# Patient Record
Sex: MALE | Race: OTHER | NOT HISPANIC OR LATINO
[De-identification: names, ages, dates, MRNs, and addresses within clinical notes are randomized per-mention and may not be internally consistent; named-entity substitution may affect disease eponyms.]

---

## 2018-08-17 NOTE — ASU PATIENT PROFILE, ADULT - PMH
Atrial fibrillation    HLD (hyperlipidemia)    HTN (hypertension)    MARGARITA (obstructive sleep apnea)  pt denies  Pulmonary HTN

## 2018-08-19 ENCOUNTER — TRANSCRIPTION ENCOUNTER (OUTPATIENT)
Age: 83
End: 2018-08-19

## 2018-08-20 ENCOUNTER — OUTPATIENT (OUTPATIENT)
Dept: OUTPATIENT SERVICES | Facility: HOSPITAL | Age: 83
LOS: 1 days | End: 2018-08-20
Payer: MEDICARE

## 2018-08-20 VITALS
SYSTOLIC BLOOD PRESSURE: 181 MMHG | TEMPERATURE: 98 F | DIASTOLIC BLOOD PRESSURE: 63 MMHG | RESPIRATION RATE: 22 BRPM | OXYGEN SATURATION: 95 % | HEART RATE: 60 BPM | HEIGHT: 70.5 IN | WEIGHT: 206.79 LBS

## 2018-08-20 VITALS
HEART RATE: 63 BPM | DIASTOLIC BLOOD PRESSURE: 47 MMHG | RESPIRATION RATE: 15 BRPM | SYSTOLIC BLOOD PRESSURE: 146 MMHG | OXYGEN SATURATION: 97 %

## 2018-08-20 DIAGNOSIS — H33.022 RETINAL DETACHMENT WITH MULTIPLE BREAKS, LEFT EYE: ICD-10-CM

## 2018-08-20 DIAGNOSIS — Z95.0 PRESENCE OF CARDIAC PACEMAKER: Chronic | ICD-10-CM

## 2018-08-20 DIAGNOSIS — Z98.49 CATARACT EXTRACTION STATUS, UNSPECIFIED EYE: Chronic | ICD-10-CM

## 2018-08-20 PROCEDURE — C1814: CPT

## 2018-08-20 PROCEDURE — C1784: CPT

## 2018-08-20 PROCEDURE — 67108 REPAIR DETACHED RETINA: CPT | Mod: LT

## 2018-08-20 PROCEDURE — C1889: CPT

## 2018-08-20 NOTE — ASU DISCHARGE PLAN (ADULT/PEDIATRIC). - SPECIAL INSTRUCTIONS
tylenol  (acetaminophen) for pain  leave on eye patch  Position face down during the day  Sleep right ear on pillow  Do NOT sleep on back

## 2020-10-15 NOTE — ASU PATIENT PROFILE, ADULT - NS PRO AD INFO GIVEN Y
DISPLAY PLAN FREE TEXT DISPLAY PLAN FREE TEXT DISPLAY PLAN FREE TEXT DISPLAY PLAN FREE TEXT DISPLAY PLAN FREE TEXT DISPLAY PLAN FREE TEXT DISPLAY PLAN FREE TEXT DISPLAY PLAN FREE TEXT DISPLAY PLAN FREE TEXT DISPLAY PLAN FREE TEXT DISPLAY PLAN FREE TEXT DISPLAY PLAN FREE TEXT DISPLAY PLAN FREE TEXT DISPLAY PLAN FREE TEXT DISPLAY PLAN FREE TEXT DISPLAY PLAN FREE TEXT DISPLAY PLAN FREE TEXT yes

## 2022-02-13 NOTE — ASU PATIENT PROFILE, ADULT - ABLE TO REACH PT
yes 61yo M w/ right foot gangrene   - pt seen and evaluated  - afebrile, WBC 21.95 from yesterday- awaiting AM labs   - Right foot dry gangrene digits 4 and 5 now demarcating to plantar met necks w/ ischemic changes and dusky appearance to the right foot to level of rearfoot, no bogginess, no malodor, no fluctuance, no purulence expressed, right foot cool to touch  - plantar right foot submet 4/5 I&D site further explored today w/ fascial planes intact, no purulence expressed, no malodor, no tracking  - pressure wounds to right foot lateral 5th met styloid, posterior heel, and medial malleolus to level of subq, no probing to bone, no acute signs of infection   - right foot plantar hallux eschar w/ wound to bone, no fluctuance, no bogginess, no malodor, no purulence   - left foot no open wounds or lesions  - Right foot and ankle xrays showing no gas, no OM right foot, mild degeneration of malleolar tips   - right foot wound culture results pending   - recommend continue IV Vanco, Zosyn  - TISH/PVR: calcified noncompressible vessels b/l, RLE waveforms reduced in calf and ankle, LLE waveforms monophasic   - RFMR results pending, preliminary images reviewed by resident w/ no suspected abscess   - Recommend ID consult  - recommend vascular consult for potential proximal amputation   - salvageability of right foot is highly guarded, discussed possible proximal amputation w/ patient pending vasc recs and he is amenable   - pod plan [facility] pending vasc recs   - discussed w/ attending  63yo M w/ right foot gangrene   - pt seen and evaluated  - afebrile, WBC 21.95 from yesterday- awaiting AM labs   - Right foot dry gangrene digits 4 and 5 now demarcating to plantar met necks w/ ischemic changes and dusky appearance to the right foot to level of rearfoot, no bogginess, no malodor, no fluctuance, no purulence expressed, right foot cool to touch  - plantar right foot submet 4/5 I&D site further explored today w/ fascial planes intact, no purulence expressed, no malodor, no tracking  - pressure wounds to right foot lateral 5th met styloid, posterior heel, and medial malleolus to level of subq, no probing to bone, no acute signs of infection   - right foot plantar hallux eschar w/ wound to bone, no fluctuance, no bogginess, no malodor, no purulence   - left foot no open wounds or lesions  - Right foot and ankle xrays showing no gas, no OM right foot, mild degeneration of malleolar tips   - right foot wound culture results pending   - recommend continue IV Vanco, Zosyn  - TISH/PVR: calcified noncompressible vessels b/l, RLE waveforms reduced in calf and ankle, LLE waveforms monophasic   - RFMR results pending  - Recommend ID consult  - recommend vascular consult for potential proximal amputation   - salvageability of right foot is highly guarded, discussed possible proximal amputation w/ patient pending vasc recs and he is amenable   - pod plan [facility] pending vasc recs   - discussed w/ attending

## 2023-08-08 ENCOUNTER — INPATIENT (INPATIENT)
Facility: HOSPITAL | Age: 88
LOS: 2 days | Discharge: HOME | DRG: 291 | End: 2023-08-11
Attending: INTERNAL MEDICINE | Admitting: INTERNAL MEDICINE
Payer: MEDICARE

## 2023-08-08 VITALS
HEART RATE: 70 BPM | SYSTOLIC BLOOD PRESSURE: 149 MMHG | DIASTOLIC BLOOD PRESSURE: 54 MMHG | OXYGEN SATURATION: 91 % | RESPIRATION RATE: 20 BRPM | TEMPERATURE: 99 F | HEIGHT: 70 IN | WEIGHT: 198.42 LBS

## 2023-08-08 DIAGNOSIS — Z98.49 CATARACT EXTRACTION STATUS, UNSPECIFIED EYE: Chronic | ICD-10-CM

## 2023-08-08 DIAGNOSIS — J96.01 ACUTE RESPIRATORY FAILURE WITH HYPOXIA: ICD-10-CM

## 2023-08-08 DIAGNOSIS — Z95.0 PRESENCE OF CARDIAC PACEMAKER: Chronic | ICD-10-CM

## 2023-08-08 DIAGNOSIS — N40.0 BENIGN PROSTATIC HYPERPLASIA WITHOUT LOWER URINARY TRACT SYMPTOMS: ICD-10-CM

## 2023-08-08 DIAGNOSIS — I50.9 HEART FAILURE, UNSPECIFIED: ICD-10-CM

## 2023-08-08 DIAGNOSIS — I48.0 PAROXYSMAL ATRIAL FIBRILLATION: ICD-10-CM

## 2023-08-08 DIAGNOSIS — N17.9 ACUTE KIDNEY FAILURE, UNSPECIFIED: ICD-10-CM

## 2023-08-08 DIAGNOSIS — Z29.9 ENCOUNTER FOR PROPHYLACTIC MEASURES, UNSPECIFIED: ICD-10-CM

## 2023-08-08 PROBLEM — E78.5 HYPERLIPIDEMIA, UNSPECIFIED: Chronic | Status: ACTIVE | Noted: 2018-08-20

## 2023-08-08 PROBLEM — I27.20 PULMONARY HYPERTENSION, UNSPECIFIED: Chronic | Status: ACTIVE | Noted: 2018-08-20

## 2023-08-08 PROBLEM — I10 ESSENTIAL (PRIMARY) HYPERTENSION: Chronic | Status: ACTIVE | Noted: 2018-08-20

## 2023-08-08 PROBLEM — G47.33 OBSTRUCTIVE SLEEP APNEA (ADULT) (PEDIATRIC): Chronic | Status: ACTIVE | Noted: 2018-08-20

## 2023-08-08 PROBLEM — I48.91 UNSPECIFIED ATRIAL FIBRILLATION: Chronic | Status: ACTIVE | Noted: 2018-08-20

## 2023-08-08 LAB
ALBUMIN SERPL ELPH-MCNC: 3.5 G/DL — SIGNIFICANT CHANGE UP (ref 3.5–5)
ALP SERPL-CCNC: 91 U/L — SIGNIFICANT CHANGE UP (ref 40–120)
ALT FLD-CCNC: 23 U/L DA — SIGNIFICANT CHANGE UP (ref 10–60)
ANION GAP SERPL CALC-SCNC: 6 MMOL/L — SIGNIFICANT CHANGE UP (ref 5–17)
APTT BLD: 35.1 SEC — SIGNIFICANT CHANGE UP (ref 24.5–35.6)
AST SERPL-CCNC: 22 U/L — SIGNIFICANT CHANGE UP (ref 10–40)
BASOPHILS # BLD AUTO: 0.06 K/UL — SIGNIFICANT CHANGE UP (ref 0–0.2)
BASOPHILS NFR BLD AUTO: 1 % — SIGNIFICANT CHANGE UP (ref 0–2)
BILIRUB SERPL-MCNC: 0.7 MG/DL — SIGNIFICANT CHANGE UP (ref 0.2–1.2)
BUN SERPL-MCNC: 31 MG/DL — HIGH (ref 7–18)
CALCIUM SERPL-MCNC: 8.6 MG/DL — SIGNIFICANT CHANGE UP (ref 8.4–10.5)
CHLORIDE SERPL-SCNC: 99 MMOL/L — SIGNIFICANT CHANGE UP (ref 96–108)
CO2 SERPL-SCNC: 24 MMOL/L — SIGNIFICANT CHANGE UP (ref 22–31)
CREAT SERPL-MCNC: 1.48 MG/DL — HIGH (ref 0.5–1.3)
EGFR: 45 ML/MIN/1.73M2 — LOW
EOSINOPHIL # BLD AUTO: 0.11 K/UL — SIGNIFICANT CHANGE UP (ref 0–0.5)
EOSINOPHIL NFR BLD AUTO: 1.9 % — SIGNIFICANT CHANGE UP (ref 0–6)
GLUCOSE SERPL-MCNC: 126 MG/DL — HIGH (ref 70–99)
HCT VFR BLD CALC: 25.6 % — LOW (ref 39–50)
HGB BLD-MCNC: 8.1 G/DL — LOW (ref 13–17)
IMM GRANULOCYTES NFR BLD AUTO: 0.3 % — SIGNIFICANT CHANGE UP (ref 0–0.9)
INR BLD: 1.64 RATIO — HIGH (ref 0.85–1.18)
LIDOCAIN IGE QN: 124 U/L — SIGNIFICANT CHANGE UP (ref 73–393)
LYMPHOCYTES # BLD AUTO: 0.89 K/UL — LOW (ref 1–3.3)
LYMPHOCYTES # BLD AUTO: 15.5 % — SIGNIFICANT CHANGE UP (ref 13–44)
MAGNESIUM SERPL-MCNC: 2.1 MG/DL — SIGNIFICANT CHANGE UP (ref 1.6–2.6)
MCHC RBC-ENTMCNC: 24.2 PG — LOW (ref 27–34)
MCHC RBC-ENTMCNC: 31.6 GM/DL — LOW (ref 32–36)
MCV RBC AUTO: 76.4 FL — LOW (ref 80–100)
MONOCYTES # BLD AUTO: 0.88 K/UL — SIGNIFICANT CHANGE UP (ref 0–0.9)
MONOCYTES NFR BLD AUTO: 15.4 % — HIGH (ref 2–14)
NEUTROPHILS # BLD AUTO: 3.77 K/UL — SIGNIFICANT CHANGE UP (ref 1.8–7.4)
NEUTROPHILS NFR BLD AUTO: 65.9 % — SIGNIFICANT CHANGE UP (ref 43–77)
NRBC # BLD: 0 /100 WBCS — SIGNIFICANT CHANGE UP (ref 0–0)
NT-PROBNP SERPL-SCNC: 5355 PG/ML — HIGH (ref 0–450)
PLATELET # BLD AUTO: 236 K/UL — SIGNIFICANT CHANGE UP (ref 150–400)
POTASSIUM SERPL-MCNC: 5.7 MMOL/L — HIGH (ref 3.5–5.3)
POTASSIUM SERPL-SCNC: 5.7 MMOL/L — HIGH (ref 3.5–5.3)
PROT SERPL-MCNC: 6.9 G/DL — SIGNIFICANT CHANGE UP (ref 6–8.3)
PROTHROM AB SERPL-ACNC: 18.4 SEC — HIGH (ref 9.5–13)
RBC # BLD: 3.35 M/UL — LOW (ref 4.2–5.8)
RBC # FLD: 14.9 % — HIGH (ref 10.3–14.5)
SODIUM SERPL-SCNC: 129 MMOL/L — LOW (ref 135–145)
TROPONIN I, HIGH SENSITIVITY RESULT: 27.4 NG/L — SIGNIFICANT CHANGE UP
WBC # BLD: 5.73 K/UL — SIGNIFICANT CHANGE UP (ref 3.8–10.5)
WBC # FLD AUTO: 5.73 K/UL — SIGNIFICANT CHANGE UP (ref 3.8–10.5)

## 2023-08-08 PROCEDURE — 99285 EMERGENCY DEPT VISIT HI MDM: CPT

## 2023-08-08 PROCEDURE — 71045 X-RAY EXAM CHEST 1 VIEW: CPT | Mod: 26

## 2023-08-08 RX ORDER — PANTOPRAZOLE SODIUM 20 MG/1
40 TABLET, DELAYED RELEASE ORAL
Refills: 0 | Status: DISCONTINUED | OUTPATIENT
Start: 2023-08-08 | End: 2023-08-11

## 2023-08-08 RX ORDER — LANOLIN ALCOHOL/MO/W.PET/CERES
3 CREAM (GRAM) TOPICAL AT BEDTIME
Refills: 0 | Status: DISCONTINUED | OUTPATIENT
Start: 2023-08-08 | End: 2023-08-11

## 2023-08-08 RX ORDER — TAMSULOSIN HYDROCHLORIDE 0.4 MG/1
0.4 CAPSULE ORAL AT BEDTIME
Refills: 0 | Status: DISCONTINUED | OUTPATIENT
Start: 2023-08-08 | End: 2023-08-11

## 2023-08-08 RX ORDER — FUROSEMIDE 40 MG
40 TABLET ORAL ONCE
Refills: 0 | Status: COMPLETED | OUTPATIENT
Start: 2023-08-08 | End: 2023-08-08

## 2023-08-08 RX ORDER — ENOXAPARIN SODIUM 100 MG/ML
40 INJECTION SUBCUTANEOUS ONCE
Refills: 0 | Status: COMPLETED | OUTPATIENT
Start: 2023-08-08 | End: 2023-08-08

## 2023-08-08 RX ORDER — ACETAMINOPHEN 500 MG
650 TABLET ORAL EVERY 6 HOURS
Refills: 0 | Status: DISCONTINUED | OUTPATIENT
Start: 2023-08-08 | End: 2023-08-11

## 2023-08-08 RX ORDER — FUROSEMIDE 40 MG
40 TABLET ORAL
Refills: 0 | Status: DISCONTINUED | OUTPATIENT
Start: 2023-08-08 | End: 2023-08-11

## 2023-08-08 RX ORDER — NIFEDIPINE 30 MG
30 TABLET, EXTENDED RELEASE 24 HR ORAL DAILY
Refills: 0 | Status: DISCONTINUED | OUTPATIENT
Start: 2023-08-08 | End: 2023-08-08

## 2023-08-08 RX ORDER — WARFARIN SODIUM 2.5 MG/1
4 TABLET ORAL ONCE
Refills: 0 | Status: COMPLETED | OUTPATIENT
Start: 2023-08-08 | End: 2023-08-08

## 2023-08-08 RX ORDER — ATORVASTATIN CALCIUM 80 MG/1
20 TABLET, FILM COATED ORAL AT BEDTIME
Refills: 0 | Status: DISCONTINUED | OUTPATIENT
Start: 2023-08-08 | End: 2023-08-09

## 2023-08-08 RX ADMIN — TAMSULOSIN HYDROCHLORIDE 0.4 MILLIGRAM(S): 0.4 CAPSULE ORAL at 23:42

## 2023-08-08 RX ADMIN — WARFARIN SODIUM 4 MILLIGRAM(S): 2.5 TABLET ORAL at 23:42

## 2023-08-08 RX ADMIN — ENOXAPARIN SODIUM 40 MILLIGRAM(S): 100 INJECTION SUBCUTANEOUS at 21:23

## 2023-08-08 RX ADMIN — Medication 40 MILLIGRAM(S): at 18:19

## 2023-08-08 NOTE — H&P ADULT - PROBLEM SELECTOR PLAN 1
Pw shortness of breath on exertion and new onset LE +2 edema   intially sating 91% on RA, RR 20  BUN Cr 31/ 1.48   CXR- +CHF and bilateral pleural effusions.  PE: decreased lung sounds with mild exp wheezes b/l, LE +2 edema b/l   s/p lasix 40mg IV   Likely CHF exacertation Pw shortness of breath on exertion and new onset LE +2 edema   intially sating 91% on RA, RR 20  BUN Cr 31/ 1.48   CXR- +CHF and bilateral pleural effusions.  PE: decreased lung sounds with mild exp wheezes b/l, LE +2 edema b/l   s/p lasix 40mg IV   Likely CHF exacertation  started on Lasix 40IV BID   [place] for I/O

## 2023-08-08 NOTE — ED PROVIDER NOTE - OBJECTIVE STATEMENT
done 89M, pmh of AFIB (pacemaker), htn, pulm htn,  Presenting with shortness of breath.  History provided by patient and wife at bedside.  For the past 2 days the patient has felt short of breath and his legs have become significantly swollen.  In the past month the patient had his pacemaker wire replaced.  Denies any headache, dizziness, chest pain, abdominal pain.

## 2023-08-08 NOTE — H&P ADULT - ASSESSMENT
89 y/o M with PMHx HTN, HLD, Afib, PPM, BPH presents to the ED today with chief complaint of increasing exertional shortness of breath and lower extremity swelling. Admitted to medicine for new onset CHF.

## 2023-08-08 NOTE — H&P ADULT - NSHPPOAPULMEMBOLUS_GEN_A_CORE
Problem: Patient Care Overview  Goal: Plan of Care Review  Outcome: Ongoing (interventions implemented as appropriate)   10/02/19 0514   OTHER   Outcome Summary VSS, up ambulating to bathroom, voiding well, po pain meds effective, with valium. Plans to d/c home when able.     Goal: Individualization and Mutuality  Outcome: Ongoing (interventions implemented as appropriate)    Goal: Discharge Needs Assessment  Outcome: Ongoing (interventions implemented as appropriate)    Goal: Interprofessional Rounds/Family Conf  Outcome: Ongoing (interventions implemented as appropriate)      Problem: Fall Risk (Adult)  Goal: Absence of Fall  Outcome: Ongoing (interventions implemented as appropriate)      Problem: Knee Arthroplasty (Total, Partial) (Adult)  Goal: Signs and Symptoms of Listed Potential Problems Will be Absent, Minimized or Managed (Knee Arthroplasty)  Outcome: Ongoing (interventions implemented as appropriate)         no

## 2023-08-08 NOTE — ED PROVIDER NOTE - CARE PLAN
1 Principal Discharge DX:	Acute CHF  Secondary Diagnosis:	Hypoxia  Secondary Diagnosis:	Leg swelling

## 2023-08-08 NOTE — ED ADULT NURSE NOTE - NSICDXPASTMEDICALHX_GEN_ALL_CORE_FT
PAST MEDICAL HISTORY:  Atrial fibrillation     HLD (hyperlipidemia)     HTN (hypertension)     MARGARITA (obstructive sleep apnea) pt denies    Pulmonary HTN

## 2023-08-08 NOTE — H&P ADULT - ATTENDING COMMENTS
89 y/o M with PMHx HTN, HLD, Afib, PPM, BPH,Anemia CVA mild R sided res weakness, presents to the ED today with chief complaint of increasing exertional shortness of breath and lower extremity swelling,acute diastolic HF with acute hypoxic respiratory failure,,QUIANA,hyponatremia and hyperkalemia.  1.Acute diastolic HF-IV lasix.  2.QUIANA and hyponatremia-Renal eval.  3.Afib and CVA-lovenox and coumadin.  4.Anemia profile,  5.Echocardiogram.  6.HTN-hold procardia xl.  7.BPH-flomax.  8.PPI.

## 2023-08-08 NOTE — H&P ADULT - HISTORY OF PRESENT ILLNESS
Full note to follow  89 y/o M with PMHx HTN, HLD, Afib, PPM, presents to the ED today with chief complaint of increasing exertional shortness of breath and lower extremity swelling. Pt and wife at the bedside state that pt was recently seen at [facility] 89 y/o M with PMHx HTN, HLD, Afib, PPM, BPH presents to the ED today with chief complaint of increasing exertional shortness of breath and lower extremity swelling. Pt and wife at the bedside state that pts symtoms started around Sunday night where he was more short of breath and noticed his both legs started to get heavy and swollen. Pt was recently seen at [facility] for a PPM wire replacement. Follows Dr Womack outpatient. Is currently on Coumadin. Otherwise denies any chest pain, headache, weakness, visual changes, fever, chills. n/v/d.     In the ED   /54, BP 70, T 98.8, RR 20, 91% on RA started on 4L  Hbg 8.1, INR 89 y/o M with PMHx HTN, HLD, Afib, PPM, BPH presents to the ED today with chief complaint of increasing exertional shortness of breath and lower extremity swelling. Pt and wife at the bedside state that pts symtoms started around Sunday night where he was more short of breath and noticed his both legs started to get heavy and swollen. Pt was recently seen at [facility] for a PPM wire replacement. Follows Dr Womack outpatient. Is currently on Coumadin. Otherwise denies any chest pain, headache, weakness, visual changes, fever, chills. n/v/d.     In the ED   /54, BP 70, T 98.8, RR 20, 91% on RA started on 4L  Hbg 8.1, INR 1.64, Na  89 y/o M with PMHx HTN, HLD, Afib, PPM, BPH presents to the ED today with chief complaint of increasing exertional shortness of breath and lower extremity swelling. Pt and wife at the bedside state that pts symptoms started around Sunday night where he was more short of breath and noticed his both legs started to get heavy and swollen. Pt also noticed a wet cough for the past few days with white sputum. Pt was recently seen at [facility] for a PPM wire replacement. Follows Dr Womack outpatient. Is currently on Coumadin. Otherwise denies any chest pain, headache, weakness, visual changes, fever, chills. n/v/d.     In the ED   /54, BP 70, T 98.8, RR 20, 91% on RA started on 4L  Hbg 8.1, INR 1.64,  BUN Cr 31/ 1.48- ProBNP 5355  EKG- paced   CXR- +CHF and bilateral pleural effusions.  PE: decreased lung sounds with mild exp wheezes b/l, LE +2 edema b/l   s/p lasix 40mg IV    89 y/o M with PMHx HTN, HLD, Afib, PPM, BPH, CVA mild R sided res weakness, presents to the ED today with chief complaint of increasing exertional shortness of breath and lower extremity swelling. Pt and wife at the bedside state that pts symptoms started around Sunday night where he was more short of breath and noticed his both legs started to get heavy and swollen. Pt also noticed a wet cough for the past few days with white sputum. Pt was recently seen at [facility] for a PPM wire replacement. Follows Dr Womack outpatient. Is currently on Coumadin. Otherwise denies any chest pain, headache, weakness, visual changes, fever, chills. n/v/d.     In the ED   /54, BP 70, T 98.8, RR 20, 91% on RA started on 4L  Hbg 8.1, INR 1.64,  BUN Cr 31/ 1.48- ProBNP 5355  EKG- paced   CXR- +CHF and bilateral pleural effusions.  PE: decreased lung sounds with mild exp wheezes b/l, LE +2 edema b/l   s/p lasix 40mg IV    89 y/o M with PMHx HTN, HLD, Afib, PPM, BPH, Anemia, CVA mild R sided res weakness, presents to the ED today with chief complaint of increasing exertional shortness of breath and lower extremity swelling. Pt and wife at the bedside state that pts symptoms started around Sunday night where he was more short of breath and noticed his both legs started to get heavy and swollen. Pt also noticed a wet cough for the past few days with white sputum. Pt was recently seen at [facility] for a PPM wire replacement. Follows Dr Womack outpatient. Is currently on Coumadin. Otherwise denies any chest pain, headache, weakness, visual changes, fever, chills. n/v/d.     In the ED   /54, BP 70, T 98.8, RR 20, 91% on RA started on 4L  Hbg 8.1, INR 1.64,  BUN Cr 31/ 1.48- ProBNP 5355  EKG- paced   CXR- +CHF and bilateral pleural effusions.  PE: decreased lung sounds with mild exp wheezes b/l, LE +2 edema b/l   s/p lasix 40mg IV

## 2023-08-08 NOTE — ED ADULT NURSE NOTE - EDEMA DEGREE BILATERAL
Date of Service: 7/29/2021     OUTPATIENT PHYSICAL MEDICINE REHABILITATION REEVALUATION     HISTORY OF PRESENT ILLNESS:  This 62 year old left-handed White   male is seen by me for re-evaluation of spastic left hemiparesis.     Patient received 200 units of both lung toxin injection to his left lower extremity on 07/01/2021 and procedure was done by me.  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  His initial hospital care was done at [facility] and he did receive intensive inpatient rehabilitation from 07/24/2020 to 09/03/2020 and he received in-home PT and OT.  I evaluated him on 05/10/2021 and recommended outpatient neuro rehabilitation program and he has been receiving physical and occupational therapy in the neuro rehab program.     Patient indicates that he responded well to the Botox injection and he is able to advance his left lower extremity now and able to ambulate with the use of a quad cane with some assistance from his daughter or granddaughter at home.  He is using custom AFO for his left lower extremity.  He is unable to walk without the use of AFO as his knees tends to hyperextend and make him wobbly.  He still feels somewhat tight on his hip flexors on the left.  He also complain of occasional muscle spasm on the left lateral thigh while laying supine.    ALLERGIES, MEDICATIONS, AND HISTORIES:    I have reviewed his allergies and medications, also reviewed his past medical history, social history, all unchanged.       REVIEW OF SYSTEMS:    I reviewed 12 systems.  They are all negative except what is mentioned before.  He denies any problem with hisbowel and bladder.  He is also sleeping well.  Overall his left leg spasm has improved significantly.    Review of Therapy Notes:  Patient received only 5 visits of physical therapy due to  his insurance limitation and certainly that is not enough for him.  His 6 minute walk test improved from 40 ft with quad cane and left AFO to 100 feet.  Recovery from spasm has improved substantially after Botox injection and does decrease quicker with approximation through the hip joint.  He has progressed significantly despite his visit restrictions, improving his 6 minute walk test, establishing how to safely perform a floor transfer with assistance, and his ability to negotiate stairs using a single railing.  He continues to have significant restrictions in mobility and would continue to benefit from physical therapy to improve his gait mechanics, activity tolerance, stair negotiation, static and dynamic balance, coordination and exercises to assist with maximizing the affect of his Botox injections.    He receives 6 visits of occupational therapy and due to limitation of the visit from the insurance company he is being discharged from the OT.  He made steady progress in ADL areas with upper extremity dressing improving from moderate to minimal assist, toileting from dependent to minimal assist and functional transfer from minimal to supervision with small base quad cane.  Patient is reporting less pain and tolerating left upper extremity passive range of motion exercises better with improvement of range of motion at the shoulder to 90 degree.     PHYSICAL EXAMINATION:  GENERAL:  Alert, oriented male.  Accompanied by her granddaughter today.  He is well developed, well nourished, well groomed and very cooperative.  VITAL SIGNS:    Visit Vitals  /84 (BP Location: RUE - Right upper extremity, Patient Position: Sitting)   Pulse 70   Temp 98 °F (36.7 °C) (Temporal)   Wt 86.2 kg   BMI 28.89 kg/m²     HEENT:  Unremarkable.  NECK:  Supple.  No lymphadenopathy, no thyromegaly, no carotid bruit.  CHEST:  Clear to auscultation bilaterally.  HEART:  Regular rhythm, S1, S2 normal, no murmur audible.  ABDOMEN:  Soft on  palpation, no tenderness, no organomegaly.  Bowel sounds are active.  SKIN:  Warm and dry, no open area noted.  EXTREMITIES:  Does not reveal any edema, no calf tenderness.  SKIN:  Color, texture, turgor normal, no rashes or lesions in exposed areas.    NEUROLOGIC:  Heis alert, oriented.  Memory is intact, speech normal.  Cranial nerves are within normal limits.  Sensation intact.  Has some increased tone in the hip flexors today.  Hamstring and adductor tone has improved significantly since the Botox injection.  No active movement noted in the left upper extremity.  Mild increased flexor tone noted in the fingers of the left hand.  Has minimal active movement in his left leg and hip flexor and knee flexor strength appear to be 2/5.  Left ankle dorsiflexor strength is 0/5.  Left knee extensor 2 to 3/5.  Deep tendon reflexes are 2+ and symmetrical.  Gait evaluation with the left AFO and quad cane revealed typical hemiplegic gait with increased flexion in left hip and knee.  Without the AFO his left knee hyperextends and he is unable to advance his left leg.    MUSCULOSKELETAL:  Normal range of motion and strength in the right upper and lower extremity.  IMPRESSION:   1. 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  2. Essential hypertension  3. Insulin-dependent diabetes mellitus  4. 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   PLAN:  The patient needs continued physical and occupational therapy twice weekly basis as he is making steady progress with therapies.  Discontinuation of therapy at this time will be detrimental for him.  Advised family to appeal to the insurance company for more therapy visit allowance.  He will be ready for another course of botulinum toxin injection after 10/01/2021 and I will consider increasing the dose to 300 units of Botox from 200 units as he will need more dose in his left  hip flexor.  Granddaughter is inquiring about possibility of his admission to the inpatient rehabilitation unit in the hospital.  I advised her that he already completed the course of inpatient stroke rehabilitation about a year ago and he really does not have any medical necessity to be admitted to the hospital rehabilitation unit and his therapy needs can be managed in outpatient basis.  Patient remain highly motivated and he does have a very supportive daughter and granddaughter.     TIME SPENT:    40 minutes.     Ashley Kenney MD  7/29/2021   3+ pitting (1/2-1 in) (disappears in 1-2 minutes)

## 2023-08-08 NOTE — H&P ADULT - PROBLEM SELECTOR PLAN 2
pt takes Warfarin 3mg and 4mg alternating daily pt takes Warfarin 3mg and 4mg alternating daily  given 1 dose of 4mg now   f/u INR daily

## 2023-08-08 NOTE — ED PROVIDER NOTE - CLINICAL SUMMARY MEDICAL DECISION MAKING FREE TEXT BOX
89-year-old male presenting with leg swelling and shortness of breath.  Patient satting 86% on room air, placed on 4 L NC.  Spoke with patient's physician Dr. Fair: [phone] who reported concern that the patient's pacemaker was not working and that he might be in heart failure.  EKG appears to show pacemaker capture the patient does appear to be fluid overloaded so concern for new onset CHF.  Will get labs and chest x-ray to assess but patient will likely need to be admitted.  Patient's primary physician requesting the patient be admitted to Dr. Acosta.

## 2023-08-08 NOTE — H&P ADULT - PROBLEM SELECTOR PLAN 3
BUN Cr 31/ 1.48   baseline unknown   likely in the setting of acute CHF exacerbation  avoid nephrotoxic agents BUN Cr 31/ 1.48   baseline unknown   likely in the setting of acute CHF exacerbation  avoid nephrotoxic agents  consulted Nephro Dr. Kaminski

## 2023-08-08 NOTE — H&P ADULT - NSHPPHYSICALEXAM_GEN_ALL_CORE
VITALS:     GENERAL: NAD, elderly lying in bed comfortably  HEAD:  Atraumatic, Normocephalic  EYES: EOMI, PERRLA, conjunctiva and sclera clear  ENT: Moist mucous membranes  NECK: Supple, No JVD  CHEST/LUNG: dec breath sounds b/l with mild exp wheezes in upper lobes b/l   HEART: Regular rate and rhythm; No murmurs, rubs, or gallops  ABDOMEN: Bowel sounds present; Soft, Nontender, Nondistended. No hepatomegaly  EXTREMITIES:  2+ Peripheral Pulses, brisk capillary refill. No clubbing, cyanosis, or edema  NERVOUS SYSTEM:  Alert & Oriented X3, speech clear. No deficits   MSK: FROM all 4 extremities, full and equal strength  SKIN: L chest scar from PPM No rashes or lesions

## 2023-08-08 NOTE — ED PROVIDER NOTE - PHYSICAL EXAMINATION
General: well appearing male, no acute distress   HEENT: normocephalic, atraumatic   Respiratory: increased work of breathing, lungs clear to auscultation bilaterally   Cardiac: regular rate and rhythm   Abdomen: soft, non-tender, no guarding or rebound   MSK: bilateral 2+ pitting edema   Skin: warm, dry   Neuro: A&Ox3  Psych: appropriate affect Shilpa Ashby

## 2023-08-08 NOTE — ED ADULT NURSE REASSESSMENT NOTE - NS ED NURSE REASSESS COMMENT FT1
assuming care for this pt from RICHARD jackson. pt alert and oriented x 4. on cardiac monitor, reading a paced rhythm. pt on o2 at 3l/nc sats 91%. denies any distress. side rails up for safety. family at bedside.

## 2023-08-08 NOTE — ED ADULT NURSE NOTE - OBJECTIVE STATEMENT
Pt AOx4, ambulatory, sent from PCP office for SOB, abnormal chest X-rays, BL LE swelling, congested chest and cough x 1 week, worsening in past 2 days. h/o a-fib, PPM in place. EKG done, pt placed on cardiac monitor, no signs of distress noted. Pt AOx4, ambulatory, h/o CVA, right sided residual, a-fib, PPM in place.. sent from PCP office for SOB, abnormal chest X-rays, BL LE swelling, congested chest and cough x 1 week, worsening in past 2 days.  EKG done, pt placed on cardiac monitor, no signs of distress noted.

## 2023-08-08 NOTE — ED ADULT NURSE NOTE - NSFALLHARMRISKINTERV_ED_ALL_ED

## 2023-08-09 DIAGNOSIS — Z02.9 ENCOUNTER FOR ADMINISTRATIVE EXAMINATIONS, UNSPECIFIED: ICD-10-CM

## 2023-08-09 DIAGNOSIS — D64.9 ANEMIA, UNSPECIFIED: ICD-10-CM

## 2023-08-09 LAB
ANION GAP SERPL CALC-SCNC: 11 MMOL/L — SIGNIFICANT CHANGE UP (ref 5–17)
APPEARANCE UR: CLEAR — SIGNIFICANT CHANGE UP
APTT BLD: 36.1 SEC — HIGH (ref 24.5–35.6)
BACTERIA # UR AUTO: ABNORMAL /HPF
BILIRUB UR-MCNC: NEGATIVE — SIGNIFICANT CHANGE UP
BUN SERPL-MCNC: 30 MG/DL — HIGH (ref 7–18)
CALCIUM SERPL-MCNC: 8.9 MG/DL — SIGNIFICANT CHANGE UP (ref 8.4–10.5)
CHLORIDE SERPL-SCNC: 100 MMOL/L — SIGNIFICANT CHANGE UP (ref 96–108)
CHOLEST SERPL-MCNC: 93 MG/DL — SIGNIFICANT CHANGE UP
CO2 SERPL-SCNC: 22 MMOL/L — SIGNIFICANT CHANGE UP (ref 22–31)
COLOR SPEC: YELLOW — SIGNIFICANT CHANGE UP
CREAT ?TM UR-MCNC: 18 MG/DL — SIGNIFICANT CHANGE UP
CREAT SERPL-MCNC: 1.28 MG/DL — SIGNIFICANT CHANGE UP (ref 0.5–1.3)
DIFF PNL FLD: NEGATIVE — SIGNIFICANT CHANGE UP
EGFR: 53 ML/MIN/1.73M2 — LOW
EPI CELLS # UR: ABNORMAL /HPF
GLUCOSE SERPL-MCNC: 102 MG/DL — HIGH (ref 70–99)
GLUCOSE UR QL: NEGATIVE — SIGNIFICANT CHANGE UP
HCT VFR BLD CALC: 24.5 % — LOW (ref 39–50)
HDLC SERPL-MCNC: 42 MG/DL — SIGNIFICANT CHANGE UP
HGB BLD-MCNC: 7.7 G/DL — LOW (ref 13–17)
INR BLD: 1.55 RATIO — HIGH (ref 0.85–1.18)
KETONES UR-MCNC: NEGATIVE — SIGNIFICANT CHANGE UP
LEUKOCYTE ESTERASE UR-ACNC: NEGATIVE — SIGNIFICANT CHANGE UP
LIPID PNL WITH DIRECT LDL SERPL: 43 MG/DL — SIGNIFICANT CHANGE UP
MAGNESIUM SERPL-MCNC: 2.2 MG/DL — SIGNIFICANT CHANGE UP (ref 1.6–2.6)
MCHC RBC-ENTMCNC: 24 PG — LOW (ref 27–34)
MCHC RBC-ENTMCNC: 31.4 GM/DL — LOW (ref 32–36)
MCV RBC AUTO: 76.3 FL — LOW (ref 80–100)
NITRITE UR-MCNC: NEGATIVE — SIGNIFICANT CHANGE UP
NON HDL CHOLESTEROL: 51 MG/DL — SIGNIFICANT CHANGE UP
NRBC # BLD: 0 /100 WBCS — SIGNIFICANT CHANGE UP (ref 0–0)
OSMOLALITY UR: 246 MOS/KG — SIGNIFICANT CHANGE UP (ref 50–1200)
PH UR: 7 — SIGNIFICANT CHANGE UP (ref 5–8)
PHOSPHATE SERPL-MCNC: 3.9 MG/DL — SIGNIFICANT CHANGE UP (ref 2.5–4.5)
PLATELET # BLD AUTO: 211 K/UL — SIGNIFICANT CHANGE UP (ref 150–400)
POTASSIUM SERPL-MCNC: 4.5 MMOL/L — SIGNIFICANT CHANGE UP (ref 3.5–5.3)
POTASSIUM SERPL-SCNC: 4.5 MMOL/L — SIGNIFICANT CHANGE UP (ref 3.5–5.3)
PROT UR-MCNC: 15 MG/DL
PROTHROM AB SERPL-ACNC: 17.4 SEC — HIGH (ref 9.5–13)
RBC # BLD: 3.21 M/UL — LOW (ref 4.2–5.8)
RBC # FLD: 14.7 % — HIGH (ref 10.3–14.5)
RBC CASTS # UR COMP ASSIST: SIGNIFICANT CHANGE UP /HPF (ref 0–2)
SODIUM SERPL-SCNC: 133 MMOL/L — LOW (ref 135–145)
SODIUM UR-SCNC: 64 MMOL/L — SIGNIFICANT CHANGE UP
SP GR SPEC: 1 — LOW (ref 1.01–1.02)
TRIGL SERPL-MCNC: 42 MG/DL — SIGNIFICANT CHANGE UP
UROBILINOGEN FLD QL: NEGATIVE — SIGNIFICANT CHANGE UP
WBC # BLD: 5.72 K/UL — SIGNIFICANT CHANGE UP (ref 3.8–10.5)
WBC # FLD AUTO: 5.72 K/UL — SIGNIFICANT CHANGE UP (ref 3.8–10.5)
WBC UR QL: SIGNIFICANT CHANGE UP /HPF (ref 0–5)

## 2023-08-09 RX ORDER — ENOXAPARIN SODIUM 100 MG/ML
90 INJECTION SUBCUTANEOUS EVERY 12 HOURS
Refills: 0 | Status: DISCONTINUED | OUTPATIENT
Start: 2023-08-09 | End: 2023-08-10

## 2023-08-09 RX ORDER — SIMVASTATIN 20 MG/1
20 TABLET, FILM COATED ORAL AT BEDTIME
Refills: 0 | Status: DISCONTINUED | OUTPATIENT
Start: 2023-08-09 | End: 2023-08-11

## 2023-08-09 RX ORDER — SIMVASTATIN 20 MG/1
20 TABLET, FILM COATED ORAL AT BEDTIME
Refills: 0 | Status: DISCONTINUED | OUTPATIENT
Start: 2023-08-09 | End: 2023-08-09

## 2023-08-09 RX ADMIN — TAMSULOSIN HYDROCHLORIDE 0.4 MILLIGRAM(S): 0.4 CAPSULE ORAL at 22:12

## 2023-08-09 RX ADMIN — Medication 40 MILLIGRAM(S): at 05:26

## 2023-08-09 RX ADMIN — PANTOPRAZOLE SODIUM 40 MILLIGRAM(S): 20 TABLET, DELAYED RELEASE ORAL at 06:27

## 2023-08-09 RX ADMIN — ENOXAPARIN SODIUM 90 MILLIGRAM(S): 100 INJECTION SUBCUTANEOUS at 22:12

## 2023-08-09 RX ADMIN — Medication 40 MILLIGRAM(S): at 14:39

## 2023-08-09 RX ADMIN — SIMVASTATIN 20 MILLIGRAM(S): 20 TABLET, FILM COATED ORAL at 22:12

## 2023-08-09 RX ADMIN — ENOXAPARIN SODIUM 90 MILLIGRAM(S): 100 INJECTION SUBCUTANEOUS at 12:34

## 2023-08-09 NOTE — PROGRESS NOTE ADULT - PROBLEM SELECTOR PLAN 1
-p/w shortness of breath on exertion and new onset LE edema   -CXR with cardiomegaly with CHF and bilateral pleural effusions  -BNP 5355  -cont IV Lasix   -taper supplemental oxygen   -daily weights and I&Os -p/w shortness of breath on exertion and new onset LE edema   -CXR with cardiomegaly with CHF and bilateral pleural effusions  -BNP 5355  -cont IV Lasix   -taper supplemental oxygen   -daily weights and I&Os  -f/u Echo

## 2023-08-09 NOTE — PROGRESS NOTE ADULT - PROBLEM SELECTOR PLAN 3
-likely in the setting of acute CHF exacerbation, unknown baseline   -renal function improving   -avoid nephrotoxic agents  -Nephro Dr. Kaminski

## 2023-08-09 NOTE — PROGRESS NOTE ADULT - PROBLEM SELECTOR PLAN 2
-rate controlled, pt takes Warfarin 3mg and 4mg alternating daily  -dose Coumadin as per daily INR -rate controlled, pt takes Warfarin 3mg and 4mg alternating daily  -hold Coumadin  -start full dose Lovenox

## 2023-08-09 NOTE — PROGRESS NOTE ADULT - PROBLEM SELECTOR PLAN 4
-hx of anemia, as per Spouse pt was in the process of GI work up outpt   -no acute sign or symptoms of bleeding   -mon CBC closely -hx of anemia, as per Spouse pt was in the process of GI work up outpt   -no acute sign or symptoms of bleeding   -f/u anemia panel   -mon CBC closely

## 2023-08-09 NOTE — PATIENT PROFILE ADULT - FALL HARM RISK - HARM RISK INTERVENTIONS

## 2023-08-09 NOTE — CONSULT NOTE ADULT - SUBJECTIVE AND OBJECTIVE BOX
Renal consult was called for  hyponatremia and QUIANA.    89 y/o M with PMHx HTN, HLD, Afib, PPM, BPH, CVA mild R sided res weakness, presents to the ED today with chief complaint of increasing exertional shortness of breath and lower extremity swelling. Admitted for AHRF due to CHF.    /54, HR  70, T 98.8, RR 20, 91% on RA started on 4L  Hbg 8.1, INR 1.64,  BUN Cr 31/ 1.48- ProBNP 5355  EKG- paced   CXR- +CHF and bilateral pleural effusions.      PE: decreased lung sounds with mild exp wheezes b/l, LE +2 edema b/l     s/p lasix 40mg IV Home Medications:   * Patient Currently Takes Medications as of 08-Aug-2023 19:31 documented in Structured Notes  · 	WARFARIN SODIUM  4 MG TABS: Last Dose Taken:    · 	WARFARIN SODIUM  3 MG TABS: Last Dose Taken:    · 	NIFEDIPINE ER 30 MG TABLET: Last Dose Taken:  , TAKE 1 TABLET BY MOUTH EVERY DAY  · 	FUROSEMIDE  20 MG TABS: Last Dose Taken:    · 	OMEPRAZOLE  40 MG CPDR: Last Dose Taken:    · 	simvastatin 20 mg oral tablet: Last Dose Taken:  , 1 tab(s) orally once a day  · 	TAMSULOSIN HCL 0.4 MG CAPSULE: Last Dose Taken:  , TAKE 2 CAPSULES BY MOUTH EVERY NIGHT AT BEDTIME FOR 90 DAYS.Chief complain/HPI      PAST MEDICAL & SURGICAL HISTORY:  MARGARITA (obstructive sleep apnea)  pt denies      Atrial fibrillation      HLD (hyperlipidemia)      Pulmonary HTN      HTN (hypertension)      Cardiac pacemaker      S/P cataract extraction  both eyes          Home Medications Reviewed    Hospital Medications:   MEDICATIONS  (STANDING):  furosemide   Injectable 40 milliGRAM(s) IV Push two times a day  pantoprazole    Tablet 40 milliGRAM(s) Oral before breakfast  simvastatin 20 milliGRAM(s) Oral at bedtime  tamsulosin 0.4 milliGRAM(s) Oral at bedtime    MEDICATIONS  (PRN):  acetaminophen     Tablet .. 650 milliGRAM(s) Oral every 6 hours PRN Temp greater or equal to 38C (100.4F), Mild Pain (1 - 3)  melatonin 3 milliGRAM(s) Oral at bedtime PRN Insomnia      Allergies    No Known Allergies    Intolerances      Creatinine: 1.48 mg/dL (08.08.23 @ 16:50)                         7.7    5.72  )-----------( 211      ( 09 Aug 2023 06:29 )             24.5     08-09    133<L>  |  100  |  30<H>  ----------------------------<  102<H>  4.5   |  22  |  1.28    Ca    8.9      09 Aug 2023 06:29  Phos  3.9     08-09  Mg     2.2     08-09    TPro  6.9  /  Alb  3.5  /  TBili  0.7  /  DBili  x   /  AST  22  /  ALT  23  /  AlkPhos  91  08-08    PT/INR - ( 09 Aug 2023 06:29 )   PT: 17.4 sec;   INR: 1.55 ratio         PTT - ( 09 Aug 2023 06:29 )  PTT:36.1 sec  Urinalysis Basic - ( 09 Aug 2023 06:29 )              RADIOLOGY & ADDITIONAL STUDIES:    SOCIAL HISTORY: Denies ETOh,Smoking,     FAMILY HISTORY:      REVIEW OF SYSTEMS:  CONSTITUTIONAL: No malaise, No fatigue, No fevers or chills, well developed, no diaphoresis  EYES/ENT: No visual changes;  No vertigo or throat pain   NECK: No pain or stiffness  RESPIRATORY: No cough, wheezing, hemoptysis; No shortness of breath  CARDIOVASCULAR: No chest pain or palpitations. No edema  GASTROINTESTINAL: No abdominal or epigastric pain. No nausea, vomiting, or hematemesis; No diarrhea or constipation. No melena or hematochezia.  GENITOURINARY: No dysuria, frequency, foamy urine, urinary urgency, incontinence or hematuria  NEUROLOGICAL: No numbness or weakness, No tremor  SKIN: No itching, burning, rashes, or lesions   VASCULAR: No claudication  Musculoskeletal: no arthralgia, no myalgia  All other review of systems is negative unless indicated above.    VITALS:  Vital Signs Last 24 Hrs  T(C): 36.8 (09 Aug 2023 05:10), Max: 37.1 (08 Aug 2023 15:14)  T(F): 98.2 (09 Aug 2023 05:10), Max: 98.8 (08 Aug 2023 15:14)  HR: 72 (09 Aug 2023 05:10) (70 - 72)  BP: 156/67 (09 Aug 2023 05:10) (132/61 - 156/67)  BP(mean): --  RR: 16 (09 Aug 2023 05:10) (16 - 20)  SpO2: 95% (09 Aug 2023 05:10) (91% - 95%)    Parameters below as of 09 Aug 2023 05:10  Patient On (Oxygen Delivery Method): nasal cannula  O2 Flow (L/min): 4      08-08 @ 07:01  -  08-09 @ 07:00  --------------------------------------------------------  IN: 60 mL / OUT: 1000 mL / NET: -940 mL      Height (cm): 177.8 (08-08 @ 15:14)  Weight (kg): 90 (08-08 @ 15:14)  BMI (kg/m2): 28.5 (08-08 @ 15:14)  BSA (m2): 2.08 (08-08 @ 15:14)    PHYSICAL EXAM:  Constitutional: NAD  HEENT: anicteric sclera, oropharynx clear, MMM  Neck: No JVD  Respiratory: good air entrance B/L, no wheezes, rales or rhonchi  Cardiovascular: S1, S2, RRR, no pericardial rub, no murmur  Gastrointestinal: BS+, soft, no tenderness, no distension, no bruit  Pelvis: bladder non-distended, no CVA tenderness  Extremities: No cyanosis or clubbing. No peripheral edema  Neurological: A/O x 3, no focal deficits  Psychiatric: Normal mood, normal affect  : No CVA tenderness. No luu.   Skin: No rashes  Vascular: all pulses present  Access:                     Renal consult was called for  hyponatremia and QUIANA.    89 y/o M with PMHx HTN, HLD, Afib, PPM, BPH, CVA mild R sided res weakness, presents to the ED today with chief complaint of increasing exertional shortness of breath and lower extremity swelling. Admitted for AHRF due to CHF.  History of GI bleed melena and fresh blood in the last few weeks, as per the wife he is schedule for outpatient sigmoidoscopy  Change of pacemaker a month ago.    As per the wife fluid status and dyspnea improved after admisison.    /54, HR  70, T 98.8, RR 20, 91% on RA started on 4L  Hbg 8.1, INR 1.64,  BUN Cr 31/ 1.48- ProBNP 5355  EKG- paced   CXR- +CHF and bilateral pleural effusions.      PE: decreased lung sounds with mild exp wheezes b/l, LE +2 edema b/l     s/p lasix 40mg IV Home Medications:   * Patient Currently Takes Medications as of 08-Aug-2023 19:31 documented in Structured Notes  · 	WARFARIN SODIUM  4 MG TABS: Last Dose Taken:    · 	WARFARIN SODIUM  3 MG TABS: Last Dose Taken:    · 	NIFEDIPINE ER 30 MG TABLET: Last Dose Taken:  , TAKE 1 TABLET BY MOUTH EVERY DAY  · 	FUROSEMIDE  20 MG TABS: Last Dose Taken:    · 	OMEPRAZOLE  40 MG CPDR: Last Dose Taken:    · 	simvastatin 20 mg oral tablet: Last Dose Taken:  , 1 tab(s) orally once a day  · 	TAMSULOSIN HCL 0.4 MG CAPSULE: Last Dose Taken:  , TAKE 2 CAPSULES BY MOUTH EVERY NIGHT AT BEDTIME FOR 90 DAYS.Chief complain/HPI      PAST MEDICAL & SURGICAL HISTORY:  MARGARITA (obstructive sleep apnea)  pt denies      Atrial fibrillation      HLD (hyperlipidemia)      Pulmonary HTN      HTN (hypertension)      Cardiac pacemaker      S/P cataract extraction  both eyes          Home Medications Reviewed    Hospital Medications:   MEDICATIONS  (STANDING):  furosemide   Injectable 40 milliGRAM(s) IV Push two times a day  pantoprazole    Tablet 40 milliGRAM(s) Oral before breakfast  simvastatin 20 milliGRAM(s) Oral at bedtime  tamsulosin 0.4 milliGRAM(s) Oral at bedtime    MEDICATIONS  (PRN):  acetaminophen     Tablet .. 650 milliGRAM(s) Oral every 6 hours PRN Temp greater or equal to 38C (100.4F), Mild Pain (1 - 3)  melatonin 3 milliGRAM(s) Oral at bedtime PRN Insomnia      Allergies    No Known Allergies    Intolerances      Creatinine: 1.48 mg/dL (08.08.23 @ 16:50)                         7.7    5.72  )-----------( 211      ( 09 Aug 2023 06:29 )             24.5     08-09    133<L>  |  100  |  30<H>  ----------------------------<  102<H>  4.5   |  22  |  1.28    Ca    8.9      09 Aug 2023 06:29  Phos  3.9     08-09  Mg     2.2     08-09    TPro  6.9  /  Alb  3.5  /  TBili  0.7  /  DBili  x   /  AST  22  /  ALT  23  /  AlkPhos  91  08-08    PT/INR - ( 09 Aug 2023 06:29 )   PT: 17.4 sec;   INR: 1.55 ratio         PTT - ( 09 Aug 2023 06:29 )  PTT:36.1 sec  Urinalysis Basic - ( 09 Aug 2023 06:29 )              RADIOLOGY & ADDITIONAL STUDIES:  as above  SOCIAL HISTORY: Denies ETOh,Smoking,     FAMILY HISTORY:      REVIEW OF SYSTEMS:  CONSTITUTIONAL: increased weakness.  EYES/ENT: No visual changes;  No vertigo or throat pain   NECK: No pain or stiffness  RESPIRATORY: no sob at preesnt  CARDIOVASCULAR: No chest pain or palpitations. Leg  edema improved after admission   GASTROINTESTINAL: reduced po intake and fresh blood per rectum a times and melena.  GENITOURINARY: No dysuria, frequency, foamy urine, urinary urgency, incontinence or hematuria  right side weakness    VITALS:  Vital Signs Last 24 Hrs  T(C): 36.8 (09 Aug 2023 05:10), Max: 37.1 (08 Aug 2023 15:14)  T(F): 98.2 (09 Aug 2023 05:10), Max: 98.8 (08 Aug 2023 15:14)  HR: 72 (09 Aug 2023 05:10) (70 - 72)  BP: 156/67 (09 Aug 2023 05:10) (132/61 - 156/67)  BP(mean): --  RR: 16 (09 Aug 2023 05:10) (16 - 20)  SpO2: 95% (09 Aug 2023 05:10) (91% - 95%)    Parameters below as of 09 Aug 2023 05:10  Patient On (Oxygen Delivery Method): nasal cannula  O2 Flow (L/min): 4      08-08 @ 07:01  -  08-09 @ 07:00  --------------------------------------------------------  IN: 60 mL / OUT: 1000 mL / NET: -940 mL      Height (cm): 177.8 (08-08 @ 15:14)  Weight (kg): 90 (08-08 @ 15:14)  BMI (kg/m2): 28.5 (08-08 @ 15:14)  BSA (m2): 2.08 (08-08 @ 15:14)    PHYSICAL EXAM:  Constitutional: NAD  HEENT: anicteric sclera, oropharynx clear, MMM  Neck: No JVD  Respiratory: air entrance B/L, no wheezes, rales or rhonchi  Cardiovascular: S1, S2, RRR, systolic m at aortic and pumpnary area, no pericardial rub, no murmur  Gastrointestinal: BS+, soft, no tenderness, no distension, no bruit  Pelvis: bladder non-distended, no CVA tenderness  Extremities: edema tibial areas bilateral plus 1  Right side weakness

## 2023-08-09 NOTE — CONSULT NOTE ADULT - ASSESSMENT
89 years old admitted with CHF and consult was called for QUIANA and Hyponatremia  QUIANA Pre renal - cardiorenal, due to CHF , renal function improved with diuretics .  Hyponatremia due to CHF    Continue Diurteics IV, fluid restriction 1 liter per day  2 gm sodium diet  Follow Urine FeNA  Follow UA    Anemia, GI bleed,  work up as per PMD.  PAF - HR stable.

## 2023-08-09 NOTE — PATIENT PROFILE ADULT - MST SCORE
[facility]     [address]    [facility] [address]    Phone:  [phone]    Fax:  [phone]       Thank You for choosing us for your health care visit. We are glad to serve you and happy to provide you with this summary of your visit. Please help us to ensure we have accurate records. If you find anything that needs to be changed, please let our staff know as soon as possible.          Your Demographic Information     Patient Name Sex Navjot Rashid Male 1984       Ethnic Group Patient Race    Not of  or  Origin White      Your Visit Details     Date & Time Provider Department    2017 9:10 AM Pankaj Street MD [facility]      Your Vitals Were     Height Weight BMI Smoking Status          6' 5\" (1.956 m) 390 lb (176.9 kg) 46.25 kg/m2 Former Smoker        Medications Prescribed or Re-Ordered Today     None      Your Current Medications Are        Disp Refills Start End    oxyCODONE/APAP (PERCOCET)  MG per tablet 50 tablet 0 2016     Sig - Route: Take 1 tablet by mouth every 4 hours as needed for Pain. - Oral    traMADOL (ULTRAM) 50 MG tablet 30 tablet 0 2016     Sig - Route: Take 1 tablet by mouth every 6 hours as needed for Pain. - Oral      Allergies     Morphine Other (See Comments)    Family but not himself    Penicillins Other (See Comments)    Family but not himself. He has had amox with no problems              Patient Instructions    You will need to make an appointment with your primary care doctor for surgical clearance.    Please contact our office with your pre operative appointment date so we can than set up a surgery date.     Please contact central scheduling at [phone] to make a postop visit with Dr. Street for 10-12 days after surgery.    Reminders: If you are on any blood thinner or Aspirin regiments please discuss with your Primary Care Provider to determine whether you should discontinue  5-7 days prior to surgery.        PLEASE DO NOT EAT OR DRINK ANYTHING AFTER MIDNIGHT THE DAY BEFORE SURGERY        2

## 2023-08-10 ENCOUNTER — TRANSCRIPTION ENCOUNTER (OUTPATIENT)
Age: 88
End: 2023-08-10

## 2023-08-10 DIAGNOSIS — I50.9 HEART FAILURE, UNSPECIFIED: ICD-10-CM

## 2023-08-10 LAB
ALBUMIN SERPL ELPH-MCNC: 3.3 G/DL — LOW (ref 3.5–5)
ALP SERPL-CCNC: 89 U/L — SIGNIFICANT CHANGE UP (ref 40–120)
ALT FLD-CCNC: 22 U/L DA — SIGNIFICANT CHANGE UP (ref 10–60)
ANION GAP SERPL CALC-SCNC: 9 MMOL/L — SIGNIFICANT CHANGE UP (ref 5–17)
APTT BLD: 43.5 SEC — HIGH (ref 24.5–35.6)
AST SERPL-CCNC: 23 U/L — SIGNIFICANT CHANGE UP (ref 10–40)
BILIRUB SERPL-MCNC: 0.8 MG/DL — SIGNIFICANT CHANGE UP (ref 0.2–1.2)
BUN SERPL-MCNC: 31 MG/DL — HIGH (ref 7–18)
CALCIUM SERPL-MCNC: 8.7 MG/DL — SIGNIFICANT CHANGE UP (ref 8.4–10.5)
CHLORIDE SERPL-SCNC: 102 MMOL/L — SIGNIFICANT CHANGE UP (ref 96–108)
CO2 SERPL-SCNC: 26 MMOL/L — SIGNIFICANT CHANGE UP (ref 22–31)
CREAT SERPL-MCNC: 1.28 MG/DL — SIGNIFICANT CHANGE UP (ref 0.5–1.3)
EGFR: 53 ML/MIN/1.73M2 — LOW
FERRITIN SERPL-MCNC: 49 NG/ML — SIGNIFICANT CHANGE UP (ref 30–400)
GLUCOSE SERPL-MCNC: 95 MG/DL — SIGNIFICANT CHANGE UP (ref 70–99)
HCT VFR BLD CALC: 26 % — LOW (ref 39–50)
HGB BLD-MCNC: 8.3 G/DL — LOW (ref 13–17)
INR BLD: 1.65 RATIO — HIGH (ref 0.85–1.18)
IRON SATN MFR SERPL: 18 UG/DL — LOW (ref 65–170)
IRON SATN MFR SERPL: 5 % — LOW (ref 20–55)
MCHC RBC-ENTMCNC: 24.3 PG — LOW (ref 27–34)
MCHC RBC-ENTMCNC: 31.9 GM/DL — LOW (ref 32–36)
MCV RBC AUTO: 76 FL — LOW (ref 80–100)
NRBC # BLD: 0 /100 WBCS — SIGNIFICANT CHANGE UP (ref 0–0)
PLATELET # BLD AUTO: 236 K/UL — SIGNIFICANT CHANGE UP (ref 150–400)
POTASSIUM SERPL-MCNC: 4.2 MMOL/L — SIGNIFICANT CHANGE UP (ref 3.5–5.3)
POTASSIUM SERPL-SCNC: 4.2 MMOL/L — SIGNIFICANT CHANGE UP (ref 3.5–5.3)
PROT SERPL-MCNC: 6.9 G/DL — SIGNIFICANT CHANGE UP (ref 6–8.3)
PROTHROM AB SERPL-ACNC: 18.5 SEC — HIGH (ref 9.5–13)
RBC # BLD: 3.42 M/UL — LOW (ref 4.2–5.8)
RBC # FLD: 14.9 % — HIGH (ref 10.3–14.5)
SODIUM SERPL-SCNC: 137 MMOL/L — SIGNIFICANT CHANGE UP (ref 135–145)
TIBC SERPL-MCNC: 340 UG/DL — SIGNIFICANT CHANGE UP (ref 250–450)
TSH SERPL-MCNC: 4.08 UU/ML — SIGNIFICANT CHANGE UP (ref 0.34–4.82)
UIBC SERPL-MCNC: 322 UG/DL — SIGNIFICANT CHANGE UP (ref 110–370)
VIT B12 SERPL-MCNC: 595 PG/ML — SIGNIFICANT CHANGE UP (ref 232–1245)
WBC # BLD: 5 K/UL — SIGNIFICANT CHANGE UP (ref 3.8–10.5)
WBC # FLD AUTO: 5 K/UL — SIGNIFICANT CHANGE UP (ref 3.8–10.5)

## 2023-08-10 PROCEDURE — 93306 TTE W/DOPPLER COMPLETE: CPT | Mod: 26

## 2023-08-10 RX ORDER — APIXABAN 2.5 MG/1
5 TABLET, FILM COATED ORAL EVERY 12 HOURS
Refills: 0 | Status: DISCONTINUED | OUTPATIENT
Start: 2023-08-10 | End: 2023-08-11

## 2023-08-10 RX ORDER — SIMVASTATIN 20 MG/1
1 TABLET, FILM COATED ORAL
Qty: 0 | Refills: 0 | DISCHARGE
Start: 2023-08-10

## 2023-08-10 RX ORDER — APIXABAN 2.5 MG/1
1 TABLET, FILM COATED ORAL
Qty: 60 | Refills: 0
Start: 2023-08-10 | End: 2023-09-08

## 2023-08-10 RX ORDER — WARFARIN SODIUM 2.5 MG/1
0 TABLET ORAL
Qty: 0 | Refills: 0 | DISCHARGE

## 2023-08-10 RX ORDER — IRON SUCROSE 20 MG/ML
200 INJECTION, SOLUTION INTRAVENOUS EVERY 24 HOURS
Refills: 0 | Status: DISCONTINUED | OUTPATIENT
Start: 2023-08-10 | End: 2023-08-11

## 2023-08-10 RX ORDER — SACUBITRIL AND VALSARTAN 24; 26 MG/1; MG/1
1 TABLET, FILM COATED ORAL
Refills: 0 | Status: DISCONTINUED | OUTPATIENT
Start: 2023-08-10 | End: 2023-08-11

## 2023-08-10 RX ORDER — SIMVASTATIN 20 MG/1
1 TABLET, FILM COATED ORAL
Refills: 0 | DISCHARGE

## 2023-08-10 RX ORDER — SACUBITRIL AND VALSARTAN 24; 26 MG/1; MG/1
1 TABLET, FILM COATED ORAL
Qty: 60 | Refills: 0
Start: 2023-08-10 | End: 2023-09-08

## 2023-08-10 RX ADMIN — TAMSULOSIN HYDROCHLORIDE 0.4 MILLIGRAM(S): 0.4 CAPSULE ORAL at 21:57

## 2023-08-10 RX ADMIN — Medication 40 MILLIGRAM(S): at 13:48

## 2023-08-10 RX ADMIN — APIXABAN 5 MILLIGRAM(S): 2.5 TABLET, FILM COATED ORAL at 22:08

## 2023-08-10 RX ADMIN — SACUBITRIL AND VALSARTAN 1 TABLET(S): 24; 26 TABLET, FILM COATED ORAL at 13:48

## 2023-08-10 RX ADMIN — ENOXAPARIN SODIUM 90 MILLIGRAM(S): 100 INJECTION SUBCUTANEOUS at 13:47

## 2023-08-10 RX ADMIN — IRON SUCROSE 110 MILLIGRAM(S): 20 INJECTION, SOLUTION INTRAVENOUS at 14:13

## 2023-08-10 RX ADMIN — Medication 40 MILLIGRAM(S): at 05:23

## 2023-08-10 RX ADMIN — SACUBITRIL AND VALSARTAN 1 TABLET(S): 24; 26 TABLET, FILM COATED ORAL at 21:57

## 2023-08-10 RX ADMIN — SIMVASTATIN 20 MILLIGRAM(S): 20 TABLET, FILM COATED ORAL at 21:57

## 2023-08-10 RX ADMIN — PANTOPRAZOLE SODIUM 40 MILLIGRAM(S): 20 TABLET, DELAYED RELEASE ORAL at 05:23

## 2023-08-10 NOTE — DISCHARGE NOTE PROVIDER - NSDCMRMEDTOKEN_GEN_ALL_CORE_FT
Eliquis 5 mg oral tablet: 1 tab(s) orally 2 times a day  FUROSEMIDE  20 MG TABS:   NIFEDIPINE ER 30 MG TABLET: TAKE 1 TABLET BY MOUTH EVERY DAY  OMEPRAZOLE  40 MG CPDR:   sacubitril-valsartan 24 mg-26 mg oral tablet: 1 tab(s) orally 2 times a day  simvastatin 20 mg oral tablet: 1 tab(s) orally once a day (at bedtime)  TAMSULOSIN HCL 0.4 MG CAPSULE: TAKE 2 CAPSULES BY MOUTH EVERY NIGHT AT BEDTIME FOR 90 DAYS.  WARFARIN SODIUM  3 MG TABS:   WARFARIN SODIUM  4 MG TABS:    Eliquis 5 mg oral tablet: 1 tab(s) orally 2 times a day  FUROSEMIDE  20 MG TABS:   NIFEDIPINE ER 30 MG TABLET: TAKE 1 TABLET BY MOUTH EVERY DAY  OMEPRAZOLE  40 MG CPDR:   sacubitril-valsartan 24 mg-26 mg oral tablet: 1 tab(s) orally 2 times a day  simvastatin 20 mg oral tablet: 1 tab(s) orally once a day (at bedtime)  TAMSULOSIN HCL 0.4 MG CAPSULE: TAKE 2 CAPSULES BY MOUTH EVERY NIGHT AT BEDTIME FOR 90 DAYS.   Eliquis 5 mg oral tablet: 1 tab(s) orally 2 times a day  FUROSEMIDE  20 MG TABS: 1 tab(s) orally once a day  OMEPRAZOLE  40 MG CPDR: 1 tab(s) orally once a day  sacubitril-valsartan 24 mg-26 mg oral tablet: 1 tab(s) orally 2 times a day  simvastatin 20 mg oral tablet: 1 tab(s) orally once a day (at bedtime)  TAMSULOSIN HCL 0.4 MG CAPSULE: 1 tab(s) orally once a day (at bedtime)

## 2023-08-10 NOTE — DIETITIAN INITIAL EVALUATION ADULT - FACTORS AFF FOOD INTAKE
advanced age with acute on chronic comorbidities/Nondenominational/ethnic/cultural/personal food preferences

## 2023-08-10 NOTE — PROGRESS NOTE ADULT - PROBLEM SELECTOR PLAN 5
-cont home dose Flomax -hx of anemia, as per Spouse pt was in the process of GI work up outpt   -no acute sign or symptoms of bleeding   -f/u anemia panel   -mon CBC closely

## 2023-08-10 NOTE — DIETITIAN INITIAL EVALUATION ADULT - PROBLEM SELECTOR PLAN 1
Pw shortness of breath on exertion and new onset LE +2 edema   intially sating 91% on RA, RR 20  BUN Cr 31/ 1.48   CXR- +CHF and bilateral pleural effusions.  PE: decreased lung sounds with mild exp wheezes b/l, LE +2 edema b/l   s/p lasix 40mg IV   Likely CHF exacertation  started on Lasix 40IV BID   [place] for I/O

## 2023-08-10 NOTE — PROGRESS NOTE ADULT - PROBLEM SELECTOR PLAN 4
-hx of anemia, as per Spouse pt was in the process of GI work up outpt   -no acute sign or symptoms of bleeding   -f/u anemia panel   -mon CBC closely -likely in the setting of acute CHF exacerbation, unknown baseline   -renal function improving   -avoid nephrotoxic agents  -Nephro Dr. Kaminski

## 2023-08-10 NOTE — PROGRESS NOTE ADULT - PROBLEM SELECTOR PLAN 7
Please contact patient.  For now stay on 6 pills of methotrexate once a week   -discharge disposition back home pending ECHO and improvement in LE edema and anemia -dvt ppx: on full dose AC with Lovenox

## 2023-08-10 NOTE — DIETITIAN INITIAL EVALUATION ADULT - PROBLEM SELECTOR PLAN 3
BUN Cr 31/ 1.48   baseline unknown   likely in the setting of acute CHF exacerbation  avoid nephrotoxic agents  consulted Nephro Dr. Kaminski

## 2023-08-10 NOTE — DIETITIAN INITIAL EVALUATION ADULT - OTHER INFO
Pt lives home with family PTA, alert, oriented, wife at bedside, providing all information, well-communicated; reported appetite good, but wt loss 20 lb x 1y from usual ei=095 lb to 178~180 lb (unable to confirm, admit fr=715 lb 8/8/23), denied GI distress, chewing or swallowing problem at present, food choices obtained and forwarded to Dietary, >75% breakfast intake today observed; watching low Na diet at home, not interested in diet education/nutrition information at present, eager to go home per staff, RD business card given for contact as needed  Pt lives home with family PTA, alert, oriented, wife at bedside, providing all information, well-communicated; reported appetite good, but wt loss 20 lb x 1y from usual nk=103 lb to 178~180 lb (unable to confirm, admit hw=271 lb 8/8/23), denied GI distress, chewing or swallowing problem at present, food choices obtained and forwarded to Dietary, >75% breakfast intake today observed; watching low Na diet at home, not interested in diet education/nutrition information at present, eager to go home per staff, RD business card given for contact as needed; Nephrology consult noted

## 2023-08-10 NOTE — DISCHARGE NOTE PROVIDER - CARE PROVIDER_API CALL
Lei Riggs  Family Medicine  [address] [place] [address]  Phone: [phone]  Fax: [phone]  Follow Up Time: 1 week    Valdo Fair  Cardiovascular Disease  [address] [place] [address]  Phone: [phone]  Fax: [phone]  Follow Up Time: 1 week   Lei Riggs  Family Medicine  [address] [place] [address]  Phone: [phone]  Fax: [phone]  Follow Up Time: 1 week    Valdo Fair  Cardiovascular Disease  [address] [place] [address]  Phone: [phone]  Fax: [phone]  Scheduled Appointment: 08/14/2023

## 2023-08-10 NOTE — PROGRESS NOTE ADULT - PROBLEM SELECTOR PLAN 1
-p/w shortness of breath on exertion and new onset LE edema   -CXR with cardiomegaly with CHF and bilateral pleural effusions  -BNP 5355  -cont IV Lasix   -daily weights and I&Os  -f/u Echo

## 2023-08-10 NOTE — DIETITIAN INITIAL EVALUATION ADULT - FEEDING ASSISTANCE
Provide food choices within diet Rx as available/updated; Pt assisted by Diet Technician for food preferences/daily menu selection

## 2023-08-10 NOTE — PHARMACOTHERAPY INTERVENTION NOTE - COMMENTS
Patient identified by [facility] LIST for Heart Failure. [employer] CareNotes were provided to the patient for the home medications. Counseling points were provided to the patient. Patient did not have any further questions related to medications at the end of the session.

## 2023-08-10 NOTE — DIETITIAN INITIAL EVALUATION ADULT - PERTINENT MEDS FT
MEDICATIONS  (STANDING):  enoxaparin Injectable 90 milliGRAM(s) SubCutaneous every 12 hours  furosemide   Injectable 40 milliGRAM(s) IV Push two times a day  pantoprazole    Tablet 40 milliGRAM(s) Oral before breakfast  simvastatin 20 milliGRAM(s) Oral at bedtime  tamsulosin 0.4 milliGRAM(s) Oral at bedtime    MEDICATIONS  (PRN):  acetaminophen     Tablet .. 650 milliGRAM(s) Oral every 6 hours PRN Temp greater or equal to 38C (100.4F), Mild Pain (1 - 3)  melatonin 3 milliGRAM(s) Oral at bedtime PRN Insomnia

## 2023-08-10 NOTE — DIETITIAN INITIAL EVALUATION ADULT - EDUCATION DIETARY MODIFICATIONS
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

## 2023-08-10 NOTE — PROGRESS NOTE ADULT - PROBLEM SELECTOR PLAN 2
-rate controlled, pt takes Warfarin 3mg and 4mg alternating daily  -hold Coumadin  -cont full dose Lovenox -plan as above

## 2023-08-10 NOTE — DISCHARGE NOTE PROVIDER - PROVIDER TOKENS
PROVIDER:[TOKEN:[[id number]],FOLLOWUP:[1 week]],PROVIDER:[TOKEN:[[id number]],FOLLOWUP:[1 week]] PROVIDER:[TOKEN:[[id number]],FOLLOWUP:[1 week]],PROVIDER:[TOKEN:[[id number]],SCHEDULEDAPPT:[08/14/2023]]

## 2023-08-10 NOTE — PROGRESS NOTE ADULT - PROBLEM SELECTOR PLAN 3
-likely in the setting of acute CHF exacerbation, unknown baseline   -renal function improving   -avoid nephrotoxic agents  -Nephro Dr. Kaminski -rate controlled, pt takes Warfarin 3mg and 4mg alternating daily  -hold Coumadin  -cont full dose Lovenox

## 2023-08-10 NOTE — DISCHARGE NOTE PROVIDER - NSDCCPCAREPLAN_GEN_ALL_CORE_FT
PRINCIPAL DISCHARGE DIAGNOSIS  Diagnosis: Acute respiratory failure with hypoxia  Assessment and Plan of Treatment: you presented to hospital with shortness of breath, you were admitted to hospital for the congestive heart failure flare up, you were started on diuretics and supplemental oxygen. you were evaluated by Cardiologist, your symptoms improved and remained stable   Weigh yourself daily.  Follow up with PCP or Cardiologist within 1 week   If you gain 3lbs in 3 days, or 5lbs in a week call your Health Care Provider.  Do not eat or drink foods containing more than 2000mg of salt (sodium) in your diet every day.  Call your Health Care Provider if you have any swelling or increased swelling in your feet, ankles, and/or stomach.  Take all of your medication as directed.  If you become dizzy call your Health Care Provider.        SECONDARY DISCHARGE DIAGNOSES  Diagnosis: Acute CHF  Assessment and Plan of Treatment: refer to above for additional information    Diagnosis: Paroxysmal atrial fibrillation  Assessment and Plan of Treatment: You have history of Afib which is irregular heart rhythm ---------------------------------------    Diagnosis: Anemia  Assessment and Plan of Treatment: Your blood count  is lower than normal likely due to Iron deficiency, you were given IV Iron in the hospital, you did not have sign or symptoms of bleeding. Continue to follow up with your GI specialist for further follow up and recommendations    Diagnosis: QUIANA (acute kidney injury)  Assessment and Plan of Treatment: your kidney function was elevated more than your baseline likley due to CHF exacerbation . your kidney function improved and remained stable at your baseline     PRINCIPAL DISCHARGE DIAGNOSIS  Diagnosis: Acute respiratory failure with hypoxia  Assessment and Plan of Treatment: you presented to hospital with shortness of breath, you were admitted to hospital for the congestive heart failure flare up, you were started on diuretics and supplemental oxygen. you were evaluated by Cardiologist, your symptoms improved and remained stable   Weigh yourself daily.  Follow up with PCP or Cardiologist within 1 week   If you gain 3lbs in 3 days, or 5lbs in a week call your Health Care Provider.  Do not eat or drink foods containing more than 2000mg of salt (sodium) in your diet every day.  Call your Health Care Provider if you have any swelling or increased swelling in your feet, ankles, and/or stomach.  Take all of your medication as directed.  If you become dizzy call your Health Care Provider.        SECONDARY DISCHARGE DIAGNOSES  Diagnosis: Acute CHF  Assessment and Plan of Treatment: refer to above for additional information    Diagnosis: Paroxysmal atrial fibrillation  Assessment and Plan of Treatment: You have history of Afib which is irregular heart rhythm  stop taking Coumadin and start taking Eliquis 5mg twice a day which is a blood thinner  Continue taking medications as prescribed   Follow up with Cardiologist within 1 week    Diagnosis: Anemia  Assessment and Plan of Treatment: Your blood count  is lower than normal likely due to Iron deficiency, you were given IV Iron in the hospital, you did not have sign or symptoms of bleeding. Continue to follow up with your GI specialist for further follow up and recommendations    Diagnosis: QUIANA (acute kidney injury)  Assessment and Plan of Treatment: your kidney function was elevated more than your baseline likley due to CHF exacerbation . your kidney function improved and remained stable at your baseline

## 2023-08-10 NOTE — DISCHARGE NOTE PROVIDER - HOSPITAL COURSE
89 y/o M from home with PMHx HTN, HLD, Afib, PPM, BPH presents to the ED with chief complaint of increasing exertional shortness of breath and lower extremity swelling. Admitted to medicine for new onset CHF, started on Lasix. Pt was also noted with QUIANA and followed by Nephro Dr. Kaminski.   Pt was also found to have anemia, no acute sign or symptoms of bleeding. Coumadin on hold and full dose Lovenox started.   Echo showed ???????  Clinically improved, optimized for discharge with outpt follow up   Please note that this a brief summary of hospital course please refer to daily progress notes and consult notes for full course and events   89 y/o M from home with PMHx HTN, HLD, Afib, PPM, BPH presents to the ED with chief complaint of increasing exertional shortness of breath and lower extremity swelling. Admitted to medicine for new onset CHF, started on Lasix. Pt was also noted with QUIANA and followed by Nephro Dr. Kaminski.   Pt was also found to have anemia, no acute sign or symptoms of bleeding. Coumadin on hold and full dose Lovenox started.   Echo showed preserved EF.   Clinically improved, optimized for discharge with outpt follow up   Please note that this a brief summary of hospital course please refer to daily progress notes and consult notes for full course and events

## 2023-08-10 NOTE — DIETITIAN INITIAL EVALUATION ADULT - PERTINENT LABORATORY DATA
08-10    137  |  102  |  31<H>  ----------------------------<  95  4.2   |  26  |  1.28    Ca    8.7      10 Aug 2023 06:20  Phos  3.9     08-09  Mg     2.2     08-09    TPro  6.9  /  Alb  3.3<L>  /  TBili  0.8  /  DBili  x   /  AST  23  /  ALT  22  /  AlkPhos  89  08-10

## 2023-08-11 ENCOUNTER — TRANSCRIPTION ENCOUNTER (OUTPATIENT)
Age: 88
End: 2023-08-11

## 2023-08-11 VITALS
HEART RATE: 74 BPM | OXYGEN SATURATION: 97 % | TEMPERATURE: 98 F | RESPIRATION RATE: 18 BRPM | SYSTOLIC BLOOD PRESSURE: 134 MMHG | DIASTOLIC BLOOD PRESSURE: 73 MMHG

## 2023-08-11 LAB
ANION GAP SERPL CALC-SCNC: 10 MMOL/L — SIGNIFICANT CHANGE UP (ref 5–17)
APTT BLD: 39.8 SEC — HIGH (ref 24.5–35.6)
BUN SERPL-MCNC: 31 MG/DL — HIGH (ref 7–18)
CALCIUM SERPL-MCNC: 9.2 MG/DL — SIGNIFICANT CHANGE UP (ref 8.4–10.5)
CHLORIDE SERPL-SCNC: 103 MMOL/L — SIGNIFICANT CHANGE UP (ref 96–108)
CO2 SERPL-SCNC: 25 MMOL/L — SIGNIFICANT CHANGE UP (ref 22–31)
CREAT SERPL-MCNC: 1.31 MG/DL — HIGH (ref 0.5–1.3)
EGFR: 52 ML/MIN/1.73M2 — LOW
GLUCOSE SERPL-MCNC: 108 MG/DL — HIGH (ref 70–99)
HCT VFR BLD CALC: 28.8 % — LOW (ref 39–50)
HGB BLD-MCNC: 9.1 G/DL — LOW (ref 13–17)
INR BLD: 1.66 RATIO — HIGH (ref 0.85–1.18)
MCHC RBC-ENTMCNC: 23.8 PG — LOW (ref 27–34)
MCHC RBC-ENTMCNC: 31.6 GM/DL — LOW (ref 32–36)
MCV RBC AUTO: 75.4 FL — LOW (ref 80–100)
NRBC # BLD: 0 /100 WBCS — SIGNIFICANT CHANGE UP (ref 0–0)
PLATELET # BLD AUTO: 276 K/UL — SIGNIFICANT CHANGE UP (ref 150–400)
POTASSIUM SERPL-MCNC: 4.1 MMOL/L — SIGNIFICANT CHANGE UP (ref 3.5–5.3)
POTASSIUM SERPL-SCNC: 4.1 MMOL/L — SIGNIFICANT CHANGE UP (ref 3.5–5.3)
PROTHROM AB SERPL-ACNC: 18.7 SEC — HIGH (ref 9.5–13)
RBC # BLD: 3.82 M/UL — LOW (ref 4.2–5.8)
RBC # FLD: 15.1 % — HIGH (ref 10.3–14.5)
SODIUM SERPL-SCNC: 138 MMOL/L — SIGNIFICANT CHANGE UP (ref 135–145)
WBC # BLD: 7.45 K/UL — SIGNIFICANT CHANGE UP (ref 3.8–10.5)
WBC # FLD AUTO: 7.45 K/UL — SIGNIFICANT CHANGE UP (ref 3.8–10.5)

## 2023-08-11 PROCEDURE — 93005 ELECTROCARDIOGRAM TRACING: CPT

## 2023-08-11 PROCEDURE — 85025 COMPLETE CBC W/AUTO DIFF WBC: CPT

## 2023-08-11 PROCEDURE — 71045 X-RAY EXAM CHEST 1 VIEW: CPT

## 2023-08-11 PROCEDURE — 99285 EMERGENCY DEPT VISIT HI MDM: CPT | Mod: 25

## 2023-08-11 PROCEDURE — 83540 ASSAY OF IRON: CPT

## 2023-08-11 PROCEDURE — 96376 TX/PRO/DX INJ SAME DRUG ADON: CPT

## 2023-08-11 PROCEDURE — 76770 US EXAM ABDO BACK WALL COMP: CPT | Mod: 26

## 2023-08-11 PROCEDURE — 80061 LIPID PANEL: CPT

## 2023-08-11 PROCEDURE — 76770 US EXAM ABDO BACK WALL COMP: CPT

## 2023-08-11 PROCEDURE — 96372 THER/PROPH/DIAG INJ SC/IM: CPT

## 2023-08-11 PROCEDURE — 84100 ASSAY OF PHOSPHORUS: CPT

## 2023-08-11 PROCEDURE — 82728 ASSAY OF FERRITIN: CPT

## 2023-08-11 PROCEDURE — 80048 BASIC METABOLIC PNL TOTAL CA: CPT

## 2023-08-11 PROCEDURE — 85730 THROMBOPLASTIN TIME PARTIAL: CPT

## 2023-08-11 PROCEDURE — 83935 ASSAY OF URINE OSMOLALITY: CPT

## 2023-08-11 PROCEDURE — 36415 COLL VENOUS BLD VENIPUNCTURE: CPT

## 2023-08-11 PROCEDURE — 84484 ASSAY OF TROPONIN QUANT: CPT

## 2023-08-11 PROCEDURE — 96374 THER/PROPH/DIAG INJ IV PUSH: CPT

## 2023-08-11 PROCEDURE — 83690 ASSAY OF LIPASE: CPT

## 2023-08-11 PROCEDURE — 84300 ASSAY OF URINE SODIUM: CPT

## 2023-08-11 PROCEDURE — 99497 ADVNCD CARE PLAN 30 MIN: CPT

## 2023-08-11 PROCEDURE — 76857 US EXAM PELVIC LIMITED: CPT

## 2023-08-11 PROCEDURE — 80053 COMPREHEN METABOLIC PANEL: CPT

## 2023-08-11 PROCEDURE — 81001 URINALYSIS AUTO W/SCOPE: CPT

## 2023-08-11 PROCEDURE — 83550 IRON BINDING TEST: CPT

## 2023-08-11 PROCEDURE — 83880 ASSAY OF NATRIURETIC PEPTIDE: CPT

## 2023-08-11 PROCEDURE — 82607 VITAMIN B-12: CPT

## 2023-08-11 PROCEDURE — 83735 ASSAY OF MAGNESIUM: CPT

## 2023-08-11 PROCEDURE — 93306 TTE W/DOPPLER COMPLETE: CPT

## 2023-08-11 PROCEDURE — 84443 ASSAY THYROID STIM HORMONE: CPT

## 2023-08-11 PROCEDURE — 82570 ASSAY OF URINE CREATININE: CPT

## 2023-08-11 PROCEDURE — 85610 PROTHROMBIN TIME: CPT

## 2023-08-11 PROCEDURE — 85027 COMPLETE CBC AUTOMATED: CPT

## 2023-08-11 RX ORDER — NIFEDIPINE 30 MG
0 TABLET, EXTENDED RELEASE 24 HR ORAL
Qty: 0 | Refills: 2 | DISCHARGE

## 2023-08-11 RX ORDER — FUROSEMIDE 40 MG
1 TABLET ORAL
Qty: 0 | Refills: 0 | DISCHARGE

## 2023-08-11 RX ORDER — OMEPRAZOLE 10 MG/1
1 CAPSULE, DELAYED RELEASE ORAL
Qty: 0 | Refills: 0 | DISCHARGE

## 2023-08-11 RX ORDER — TAMSULOSIN HYDROCHLORIDE 0.4 MG/1
1 CAPSULE ORAL
Qty: 0 | Refills: 2 | DISCHARGE

## 2023-08-11 RX ADMIN — SACUBITRIL AND VALSARTAN 1 TABLET(S): 24; 26 TABLET, FILM COATED ORAL at 05:12

## 2023-08-11 RX ADMIN — Medication 40 MILLIGRAM(S): at 05:13

## 2023-08-11 RX ADMIN — PANTOPRAZOLE SODIUM 40 MILLIGRAM(S): 20 TABLET, DELAYED RELEASE ORAL at 05:12

## 2023-08-11 RX ADMIN — Medication 3 MILLIGRAM(S): at 00:07

## 2023-08-11 RX ADMIN — APIXABAN 5 MILLIGRAM(S): 2.5 TABLET, FILM COATED ORAL at 12:48

## 2023-08-11 RX ADMIN — IRON SUCROSE 110 MILLIGRAM(S): 20 INJECTION, SOLUTION INTRAVENOUS at 13:33

## 2023-08-11 RX ADMIN — Medication 40 MILLIGRAM(S): at 13:33

## 2023-08-11 NOTE — PROGRESS NOTE ADULT - PROVIDER SPECIALTY LIST ADULT
Internal Medicine
Nephrology
Cardiology
Nephrology
Internal Medicine

## 2023-08-11 NOTE — PROGRESS NOTE ADULT - SUBJECTIVE AND OBJECTIVE BOX
CHIEF COMPLAINT:Patient is a 89y old  Male who presents with a chief complaint of Lower extremity swelling .Pt appears comfortable.    	  REVIEW OF SYSTEMS:  	  [ x] Unable to obtain    PHYSICAL EXAM:  T(C): 36.8 (08-09-23 @ 05:10), Max: 37.1 (08-08-23 @ 15:14)  HR: 72 (08-09-23 @ 05:10) (70 - 72)  BP: 156/67 (08-09-23 @ 05:10) (132/61 - 156/67)  RR: 16 (08-09-23 @ 05:10) (16 - 20)  SpO2: 95% (08-09-23 @ 08:54) (91% - 95%)  Wt(kg): --  I&O's Summary    08 Aug 2023 07:01  -  09 Aug 2023 07:00  --------------------------------------------------------  IN: 60 mL / OUT: 1000 mL / NET: -940 mL        Appearance: Normal	  HEENT:   Normal oral mucosa, PERRL, EOMI	  Lymphatic: No lymphadenopathy  Cardiovascular: Normal S1 S2, No JVD, No murmurs, +2 edema  Respiratory: Lungs clear to auscultation	  Dec BS at bases  Gastrointestinal:  Soft, Non-tender, + BS	  Skin: No rashes, No ecchymoses, No cyanosis	  Extremities: Normal range of motion, No clubbing, cyanosis +2 edema  Vascular: Peripheral pulses palpable 2+ bilaterally    MEDICATIONS  (STANDING):  furosemide   Injectable 40 milliGRAM(s) IV Push two times a day  pantoprazole    Tablet 40 milliGRAM(s) Oral before breakfast  simvastatin 20 milliGRAM(s) Oral at bedtime  tamsulosin 0.4 milliGRAM(s) Oral at bedtime    	    ECG:  paced	    LABS:	 	          Troponin I, High Sensitivity Result: 27.4 ng/L (08-08 @ 16:50)                            7.7    5.72  )-----------( 211      ( 09 Aug 2023 06:29 )             24.5     08-09    133<L>  |  100  |  30<H>  ----------------------------<  102<H>  4.5   |  22  |  1.28    Ca    8.9      09 Aug 2023 06:29  Phos  3.9     08-09  Mg     2.2     08-09    TPro  6.9  /  Alb  3.5  /  TBili  0.7  /  DBili  x   /  AST  22  /  ALT  23  /  AlkPhos  91  08-08    proBNP:   Lipid Profile: Cholesterol 93  LDL --  HDL 42  TG 42  Ldl calc 43  Ratio --    	    < from: Xray Chest 1 View- PORTABLE-Urgent (08.08.23 @ 17:54) >  ACC: [id number] EXAM:  XR CHEST PORTABLE URGENT 1V   ORDERED BY: ASHLEY MELLO     PROCEDURE DATE:  08/08/2023          INTERPRETATION:  An AP portable chest x-ray was performed for chest pain.    There are no prior studies for comparison.    There is an enlarged heart with multilead pacemaker in situ and   associated with pulmonary venous congestion and bilateral pleural   effusions. There is probable passive atelectasis in both lower lobes.   There is no pneumothorax. There is no other hilaror mediastinal   abnormality. The bony thorax is notable for degenerative changes of the   spine and shoulders.    IMPRESSION:  1. Cardiomegaly with CHF and bilateral pleural effusions.  2. Multilead pacemaker in situ.  3. Probable passive atelectasis in both lower lobes.    < end of copied text >      
  CHIEF COMPLAINT:Patient is a 89y old  Male who presents with a chief complaint of Lower extremity swelling .Pt is feeling better.  	  REVIEW OF SYSTEMS:  CONSTITUTIONAL: No fever, weight loss, or fatigue  EYES: No eye pain, visual disturbances, or discharge  ENT:  No difficulty hearing, tinnitus, vertigo; No sinus or throat pain  NECK: No pain or stiffness  RESPIRATORY: No cough, wheezing, chills or hemoptysis; No Shortness of Breath  CARDIOVASCULAR: No chest pain, palpitations, passing out, dizziness, or leg swelling  GASTROINTESTINAL: No abdominal or epigastric pain. No nausea, vomiting, or hematemesis; No diarrhea or constipation. No melena or hematochezia.  GENITOURINARY: No dysuria, frequency, hematuria, or incontinence  NEUROLOGICAL: No headaches, memory loss, loss of strength, numbness, or tremors  SKIN: No itching, burning, rashes, or lesions   LYMPH Nodes: No enlarged glands  ENDOCRINE: No heat or cold intolerance; No hair loss  MUSCULOSKELETAL: No joint pain or swelling; No muscle, back, or extremity pain  PSYCHIATRIC: No depression, anxiety, mood swings, or difficulty sleeping  HEME/LYMPH: No easy bruising, or bleeding gums  ALLERGY AND IMMUNOLOGIC: No hives or eczema	        PHYSICAL EXAM:  T(C): 36.8 (08-11-23 @ 04:54), Max: 36.8 (08-11-23 @ 04:54)  HR: 70 (08-11-23 @ 04:54) (70 - 73)  BP: 133/73 (08-11-23 @ 04:54) (133/73 - 145/68)  RR: 18 (08-11-23 @ 04:54) (18 - 18)  SpO2: 96% (08-11-23 @ 04:54) (96% - 98%)  Wt(kg): --  I&O's Summary    10 Aug 2023 07:01  -  11 Aug 2023 07:00  --------------------------------------------------------  IN: 0 mL / OUT: 900 mL / NET: -900 mL        Appearance: Normal	  HEENT:   Normal oral mucosa, PERRL, EOMI	  Lymphatic: No lymphadenopathy  Cardiovascular: Normal S1 S2, No JVD, No murmurs, +1 edema  Respiratory: Lungs clear to auscultation	  Psychiatry: A & O x 3, Mood & affect appropriate  Gastrointestinal:  Soft, Non-tender, + BS	  Skin: No rashes, No ecchymoses, No cyanosis	  Neurologic: Non-focal  Extremities: Normal range of motion, No clubbing, cyanosis +1 edema  Vascular: Peripheral pulses palpable 2+ bilaterally    MEDICATIONS  (STANDING):  apixaban 5 milliGRAM(s) Oral every 12 hours  furosemide   Injectable 40 milliGRAM(s) IV Push two times a day  iron sucrose IVPB 200 milliGRAM(s) IV Intermittent every 24 hours  pantoprazole    Tablet 40 milliGRAM(s) Oral before breakfast  sacubitril 24 mG/valsartan 26 mG 1 Tablet(s) Oral two times a day  simvastatin 20 milliGRAM(s) Oral at bedtime  tamsulosin 0.4 milliGRAM(s) Oral at bedtime      	  	  LABS:	 	        Troponin I, High Sensitivity Result: 27.4 ng/L (08-08 @ 16:50)                            9.1    7.45  )-----------( 276      ( 11 Aug 2023 06:03 )             28.8     08-11    138  |  103  |  31<H>  ----------------------------<  108<H>  4.1   |  25  |  1.31<H>    Ca    9.2      11 Aug 2023 06:03    TPro  6.9  /  Alb  3.3<L>  /  TBili  0.8  /  DBili  x   /  AST  23  /  ALT  22  /  AlkPhos  89  08-10    proBNP:   Lipid Profile: Cholesterol 93  LDL --  HDL 42  TG 42  Ldl calc 43  Ratio --    HgA1c:   TSH: Thyroid Stimulating Hormone, Serum: 4.08 uU/mL (08-10 @ 06:20)      	    < from: Transthoracic Echocardiogram (08.10.23 @ 12:08) >  OBSERVATIONS:  Mitral Valve: Moderate posterior mitral annular  calcification. Mild to moderate mitral regurgitation.  Aortic Root: Normal aortic root.  Aortic Valve: Calcified trileaflet aortic valve with  decreased opening. Peak transaortic valve gradient equals  65.8 mm Hg, mean transaortic valve gradient equals 28 mm  Hg, estimated aortic valve area equals 1.2 sqcm (by  continuity equation), dimensionless index 0.32, consistent  with moderate aortic stenosis. Mild to moderate aortic  regurgitation ( msec). Peak left ventricular outflow  tract gradient equals 6.8 mm Hg.  Left Atrium: Severely dilated left atrium.  LA volume index  = 90 cc/m2.  Left Ventricle: Normal Left Ventricular Systolic Function,  (EF = 58% by biplane). Paradoxical septal motion is seen,  consistent with conduction delay. Mild left ventricular  enlargement. Grade III diastolic dysfunction (severe).  Right Heart: Severe right atrial enlargement. Normal right  ventricular size and systolic function (TAPSE 2.0 cm). A  device lead is visualized in the right heart. Normal  tricuspid valve. Severe tricuspid regurgitation.  Pulmonic  valve not well seen. Mild pulmonic insufficiency is noted.  Pericardium/PleuraNo pericardial effusion.  Hemodynamic: RA Pressure is 8 mm Hg. RV systolic pressure  is severely increased at  73 mm Hg.  ------------------------------------------------------------------------  CONCLUSIONS:  1. Moderate posterior mitral annular calcification. Mild to  moderate mitral regurgitation.  2. Calcified trileaflet aortic valve with decreased  opening. Peak transaortic valve gradient equals 65.8 mm Hg,  mean transaortic valve gradient equals 28 mm Hg, estimated  aortic valve area equals 1.2 sqcm (by continuity equation),  dimensionless index 0.32, consistent with moderate aortic  stenosis. Mild to moderate aortic regurgitation (  msec).  3. Severely dilated left atrium.  4. Mild left ventricular enlargement.  5. Normal Left Ventricular Systolic Function,  (EF = 58% by  biplane). Paradoxical septal motion is seen, consistent  with conduction delay.  6. Grade III diastolic dysfunction (severe).  7. Severe right atrial enlargement.  8. Normal right ventricular size and systolic function  (TAPSE 2.0 cm). A device lead is visualized in the right  heart.  9. RV systolic pressure is severely increased at  73 mm Hg.  10. Normal tricuspid valve. Severe tricuspid regurgitation.    11. Pulmonic valve not well seen. Mild pulmonic  insufficiency is noted.  12. No pericardial effusion.    *** No previous Echo exam.  ------------------------------------------------------------------------  Confirmed on  8/11/2023 - 09:54:44 by Marcial Victoria MD  ------------------------------------------------------------------------    < end of copied text >      
  CHIEF COMPLAINT:Patient is a 89y old  Male who presents with a chief complaint of Lower extremity swelling.Pt appears comfortable.    	  REVIEW OF SYSTEMS:  CONSTITUTIONAL: No fever, weight loss, or fatigue  EYES: No eye pain, visual disturbances, or discharge  ENT:  No difficulty hearing, tinnitus, vertigo; No sinus or throat pain  NECK: No pain or stiffness  RESPIRATORY: No cough, wheezing, chills or hemoptysis; No Shortness of Breath  CARDIOVASCULAR: No chest pain, palpitations, passing out, dizziness, + leg swelling  GASTROINTESTINAL: No abdominal or epigastric pain. No nausea, vomiting, or hematemesis; No diarrhea or constipation. No melena or hematochezia.  GENITOURINARY: No dysuria, frequency, hematuria, or incontinence  NEUROLOGICAL: No headaches, memory loss, loss of strength, numbness, or tremors  SKIN: No itching, burning, rashes, or lesions   LYMPH Nodes: No enlarged glands  ENDOCRINE: No heat or cold intolerance; No hair loss  MUSCULOSKELETAL: No joint pain or swelling; No muscle, back, or extremity pain  PSYCHIATRIC: No depression, anxiety, mood swings, or difficulty sleeping  HEME/LYMPH: No easy bruising, or bleeding gums  ALLERGY AND IMMUNOLOGIC: No hives or eczema	        PHYSICAL EXAM:  T(C): 36.3 (08-10-23 @ 06:45), Max: 36.9 (08-09-23 @ 21:13)  HR: 72 (08-10-23 @ 06:45) (70 - 72)  BP: 169/64 (08-10-23 @ 06:45) (145/62 - 171/89)  RR: 18 (08-10-23 @ 06:45) (18 - 18)  SpO2: 96% (08-10-23 @ 06:45) (95% - 98%)  Wt(kg): --  I&O's Summary    09 Aug 2023 07:01  -  10 Aug 2023 07:00  --------------------------------------------------------  IN: 0 mL / OUT: 1700 mL / NET: -1700 mL        Appearance: Normal	  HEENT:   Normal oral mucosa, PERRL, EOMI	  Lymphatic: No lymphadenopathy  Cardiovascular: Normal S1 S2, No JVD, No murmurs, +2 edema  Respiratory: Lungs clear to auscultation	  Psychiatry: A & O x 3, Mood & affect appropriate  Gastrointestinal:  Soft, Non-tender, + BS	  Skin: No rashes, No ecchymoses, No cyanosis	  Neurologic: Non-focal  Extremities: Normal range of motion, No clubbing, cyanosis +2 edema  Vascular: Peripheral pulses palpable 2+ bilaterally    MEDICATIONS  (STANDING):  enoxaparin Injectable 90 milliGRAM(s) SubCutaneous every 12 hours  furosemide   Injectable 40 milliGRAM(s) IV Push two times a day  iron sucrose IVPB 200 milliGRAM(s) IV Intermittent every 24 hours  pantoprazole    Tablet 40 milliGRAM(s) Oral before breakfast  simvastatin 20 milliGRAM(s) Oral at bedtime  tamsulosin 0.4 milliGRAM(s) Oral at bedtime      	  LABS:	 	    Troponin I, High Sensitivity Result: 27.4 ng/L (08-08 @ 16:50)                            8.3    5.00  )-----------( 236      ( 10 Aug 2023 06:20 )             26.0     08-10    137  |  102  |  31<H>  ----------------------------<  95  4.2   |  26  |  1.28    Ca    8.7      10 Aug 2023 06:20  Phos  3.9     08-09  Mg     2.2     08-09    TPro  6.9  /  Alb  3.3<L>  /  TBili  0.8  /  DBili  x   /  AST  23  /  ALT  22  /  AlkPhos  89  08-10      Lipid Profile: Cholesterol 93  LDL --  HDL 42  TG 42  Ldl calc 43  Ratio --    HgA1c:   TSH: Thyroid Stimulating Hormone, Serum: 4.08 uU/mL (08-10 @ 06:20)      	    Iron with Total Binding Capacity in AM (08.10.23 @ 06:20)   Iron - Total Binding Capacity.: 340 ug/dL  % Saturation, Iron: 5 %  Iron Total: 18 ug/dL  Unsaturated Iron Binding Capacity: 322 ug/dL    
Problem List:  CHF diastolic HF   QUIANA and Hyponatremia with CHF  Creatinine 1.34  Patient feels well no c/o SOB and no leg edema      PAST MEDICAL & SURGICAL HISTORY:  MARGARITA (obstructive sleep apnea)  pt denies      Atrial fibrillation      HLD (hyperlipidemia)      Pulmonary HTN      HTN (hypertension)      Cardiac pacemaker      S/P cataract extraction  both eyes          No Known Allergies      MEDICATIONS  (STANDING):  apixaban 5 milliGRAM(s) Oral every 12 hours  furosemide   Injectable 40 milliGRAM(s) IV Push two times a day  iron sucrose IVPB 200 milliGRAM(s) IV Intermittent every 24 hours  pantoprazole    Tablet 40 milliGRAM(s) Oral before breakfast  sacubitril 24 mG/valsartan 26 mG 1 Tablet(s) Oral two times a day  simvastatin 20 milliGRAM(s) Oral at bedtime  tamsulosin 0.4 milliGRAM(s) Oral at bedtime    MEDICATIONS  (PRN):  acetaminophen     Tablet .. 650 milliGRAM(s) Oral every 6 hours PRN Temp greater or equal to 38C (100.4F), Mild Pain (1 - 3)  melatonin 3 milliGRAM(s) Oral at bedtime PRN Insomnia                            9.1    7.45  )-----------( 276      ( 11 Aug 2023 06:03 )             28.8     08-11    138  |  103  |  31<H>  ----------------------------<  108<H>  4.1   |  25  |  1.31<H>    Ca    9.2      11 Aug 2023 06:03    TPro  6.9  /  Alb  3.3<L>  /  TBili  0.8  /  DBili  x   /  AST  23  /  ALT  22  /  AlkPhos  89  08-10    PT/INR - ( 11 Aug 2023 06:03 )   PT: 18.7 sec;   INR: 1.66 ratio         PTT - ( 11 Aug 2023 06:03 )  PTT:39.8 sec    Creatinine, Random Urine: 18 mg/dL (08-09 @ 12:40)  Sodium, Random Urine: 64 mmol/L (08-09 @ 12:40)  Osmolality, Random Urine: 246 mos/kg (08-09 @ 12:40)  Urinalysis + Microscopic Examination (08.09.23 @ 12:40)   pH Urine: 7.0  Urine Appearance: Clear  Color: Yellow  Specific Gravity: 1.005  Protein, Urine: 15 mg/dL  Glucose Qualitative, Urine: Negative  Ketone - Urine: Negative  Blood, Urine: Negative  Bilirubin: Negative  Urobilinogen: Negative  Leukocyte Esterase Concentration: Negative  Nitrite: Negative  White Blood Cell - Urine: 0-2 /HPF  Red Blood Cell - Urine: 0-2 /HPF  Bacteria: Trace /HPF  Squamous Epithelial Cells: Occasional /HPF    REVIEW OF SYSTEMS:  General: no fever no chills, no weight loss.  RESPIRATORY: No cough, wheezing, hemoptysis; No shortness of breath  CARDIOVASCULAR: No chest pain or palpitations. No Edema  GASTROINTESTINAL: No abdominal or epigastric pain. No nausea, vomiting. No diarrhea or constipation. No melena.  GENITOURINARY: No dysuria, frequency, foamy urine, urinary urgency, incontinence or hematuria  NEUROLOGICAL: No numbness or weakness, no tremor , no dizziness.   Muscle skeletal : no joint pain and no swelling of joints and limbs.  SKIN: No itching, burning, rashes.        VITALS:  T(F): 98.2 (08-11-23 @ 04:54), Max: 98.2 (08-11-23 @ 04:54)  HR: 70 (08-11-23 @ 04:54)  BP: 133/73 (08-11-23 @ 04:54)  RR: 18 (08-11-23 @ 04:54)  SpO2: 96% (08-11-23 @ 04:54)  Wt(kg): --    08-10 @ 07:01  -  08-11 @ 07:00  --------------------------------------------------------  IN: 0 mL / OUT: 900 mL / NET: -900 mL        PHYSICAL EXAM:  Constitutional: well developed, no diaphoresis, no distress.  Neck: No JVD, no carotid bruit, supple, no adenopathy  Respiratory: air entrance B/L, no wheezes, rales or rhonchi  Cardiovascular: irregular , irregular, no pericardial rub, no murmur  Abdomen: BS+, soft, no tenderness, no bruit  Pelvis: bladder nondistended  Extremities: No cyanosis or clubbing. No peripheral edema.         
Problem List:  QUIANA  CHF      PAST MEDICAL & SURGICAL HISTORY:  MARGARITA (obstructive sleep apnea)  pt denies      Atrial fibrillation      HLD (hyperlipidemia)      Pulmonary HTN      HTN (hypertension)      Cardiac pacemaker      S/P cataract extraction  both eyes          No Known Allergies      MEDICATIONS  (STANDING):  enoxaparin Injectable 90 milliGRAM(s) SubCutaneous every 12 hours  furosemide   Injectable 40 milliGRAM(s) IV Push two times a day  pantoprazole    Tablet 40 milliGRAM(s) Oral before breakfast  simvastatin 20 milliGRAM(s) Oral at bedtime  tamsulosin 0.4 milliGRAM(s) Oral at bedtime    MEDICATIONS  (PRN):  acetaminophen     Tablet .. 650 milliGRAM(s) Oral every 6 hours PRN Temp greater or equal to 38C (100.4F), Mild Pain (1 - 3)  melatonin 3 milliGRAM(s) Oral at bedtime PRN Insomnia                            8.3    5.00  )-----------( 236      ( 10 Aug 2023 06:20 )             26.0     08-10    137  |  102  |  31<H>  ----------------------------<  95  4.2   |  26  |  1.28    Ca    8.7      10 Aug 2023 06:20  Phos  3.9     08-09  Mg     2.2     08-09    TPro  6.9  /  Alb  3.3<L>  /  TBili  0.8  /  DBili  x   /  AST  23  /  ALT  22  /  AlkPhos  89  08-10    PT/INR - ( 10 Aug 2023 06:20 )   PT: 18.5 sec;   INR: 1.65 ratio         PTT - ( 10 Aug 2023 06:20 )  PTT:43.5 sec    Creatinine, Random Urine: 18 mg/dL (08-09 @ 12:40)  Sodium, Random Urine: 64 mmol/L (08-09 @ 12:40)  Osmolality, Random Urine: 246 mos/kg (08-09 @ 12:40)  % Saturation, Iron: 5 % (08.10.23 @ 06:20) Iron Total: 18 ug/dL (08.10.23 @ 06:20)     REVIEW OF SYSTEMS:  General: no fever no chills, no weight loss.    RESPIRATORY: No cough, wheezing, hemoptysis; No shortness of breath  CARDIOVASCULAR: No chest pain or palpitations. No Edema  GASTROINTESTINAL: No abdominal or epigastric pain. No nausea, vomiting. No diarrhea or constipation. No melena.  GENITOURINARY: No dysuria, frequency, foamy urine, urinary urgency, incontinence or hematuria  NEUROLOGICAL: No numbness or weakness, no tremor , no dizziness.           VITALS:  T(F): 97.3 (08-10-23 @ 06:45), Max: 98.4 (08-09-23 @ 21:13)  HR: 72 (08-10-23 @ 06:45)  BP: 169/64 (08-10-23 @ 06:45)  RR: 18 (08-10-23 @ 06:45)  SpO2: 96% (08-10-23 @ 06:45)  Wt(kg): --    08-09 @ 07:01  -  08-10 @ 07:00  --------------------------------------------------------  IN: 0 mL / OUT: 1700 mL / NET: -1700 mL        PHYSICAL EXAM:  Constitutional: well developed, no diaphoresis, no distress.  Neck: No JVD, no carotid bruit, supple, no adenopathy  Respiratory: Good air entrance B/L, no wheezes, rales or rhonchi  Cardiovascular: S1, S2, RRR, no pericardial rub, no murmur  Abdomen: BS+, soft, no tenderness, no bruit  Pelvis: bladder nondistended  Extremities: No cyanosis or clubbing. No peripheral edema.   Awake and alert.    
Patient is a 89y old  Male who presents with a chief complaint of Lower extremity swelling (09 Aug 2023 11:15)  Seen and examined with spouse at bedside     OVERNIGHT EVENTS: pt reports "feeling much better", eager to go home     REVIEW OF SYSTEMS:  CONSTITUTIONAL: No fever, chills  NECK: No pain or stiffness  RESPIRATORY: No cough, SOB  CARDIOVASCULAR: No chest pain, palpitations  GASTROINTESTINAL: No abdominal pain  GENITOURINARY: No dysuria  NEUROLOGICAL: No HA  MUSCULOSKELETAL: +ve LE swelling b/l- improving     T(C): 36.3 (08-10-23 @ 06:45), Max: 36.9 (08-09-23 @ 21:13)  HR: 72 (08-10-23 @ 06:45) (70 - 72)  BP: 169/64 (08-10-23 @ 06:45) (145/62 - 171/89)  RR: 18 (08-10-23 @ 06:45) (18 - 18)  SpO2: 96% (08-10-23 @ 06:45) (95% - 98%)  Wt(kg): --Vital Signs Last 24 Hrs  T(C): 36.3 (10 Aug 2023 06:45), Max: 36.9 (09 Aug 2023 21:13)  T(F): 97.3 (10 Aug 2023 06:45), Max: 98.4 (09 Aug 2023 21:13)  HR: 72 (10 Aug 2023 06:45) (70 - 72)  BP: 169/64 (10 Aug 2023 06:45) (145/62 - 171/89)  BP(mean): --  RR: 18 (10 Aug 2023 06:45) (18 - 18)  SpO2: 96% (10 Aug 2023 06:45) (95% - 98%)    Parameters below as of 10 Aug 2023 06:45  Patient On (Oxygen Delivery Method): room air    MEDICATIONS  (STANDING):  enoxaparin Injectable 90 milliGRAM(s) SubCutaneous every 12 hours  furosemide   Injectable 40 milliGRAM(s) IV Push two times a day  pantoprazole    Tablet 40 milliGRAM(s) Oral before breakfast  simvastatin 20 milliGRAM(s) Oral at bedtime  tamsulosin 0.4 milliGRAM(s) Oral at bedtime    MEDICATIONS  (PRN):  acetaminophen     Tablet .. 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   EXTREMITIES/SKIN: 2-3+b/l LE edema with chronic skin changes,  no calf tenderness    Consultant(s) Notes Reviewed:  [x ] YES  [ ] NO  Care Discussed with Consultants/Other Providers [ x] YES  [ ] NO    LABS:                        8.3    5.00  )-----------( 236      ( 10 Aug 2023 06:20 )             26.0     08-10    137  |  102  |  31<H>  ----------------------------<  95  4.2   |  26  |  1.28    Ca    8.7      10 Aug 2023 06:20  Phos  3.9     08-09  Mg     2.2     08-09    TPro  6.9  /  Alb  3.3<L>  /  TBili  0.8  /  DBili  x   /  AST  23  /  ALT  22  /  AlkPhos  89  08-10    PT/INR - ( 10 Aug 2023 06:20 )   PT: 18.5 sec;   INR: 1.65 ratio       PTT - ( 10 Aug 2023 06:20 )  PTT:43.5 sec  CAPILLARY BLOOD GLUCOSE    Urinalysis Basic - ( 10 Aug 2023 06:20 )  Color: x / Appearance: x / SG: x / pH: x  Gluc: 95 mg/dL / Ketone: x  / Bili: x / Urobili: x   Blood: x / Protein: x / Nitrite: x   Leuk Esterase: x / RBC: x / WBC x   Sq Epi: x / Non Sq Epi: x / Bacteria: x    RADIOLOGY & ADDITIONAL TESTS:  < from: Xray Chest 1 View- PORTABLE-Urgent (08.08.23 @ 17:54) >    ACC: [id number] EXAM:  XR CHEST PORTABLE URGENT 1V   ORDERED BY: ASHLEY MELLO     PROCEDURE DATE:  08/08/2023          INTERPRETATION:  An AP portable chest x-ray was performed for chest pain.    There are no prior studies for comparison.    There is an enlarged heart with multilead pacemaker in situ and   associated with pulmonary venous congestion and bilateral pleural   effusions. There is probable passive atelectasis in both lower lobes.   There is no pneumothorax. There is no other hilaror mediastinal   abnormality. The bony thorax is notable for degenerative changes of the   spine and shoulders.    IMPRESSION:  1. Cardiomegaly with CHF and bilateral pleural effusions.  2. Multilead pacemaker in situ.  3. Probable passive atelectasis in both lower lobes.    < end of copied text >  
Patient is a 89y old  Male who presents with a chief complaint of Lower extremity swelling (09 Aug 2023 08:38)  Seen and examined with spouse at bedside     OVERNIGHT EVENTS: no acute events overnight, siting up at the edge of bed eating breakfast     REVIEW OF SYSTEMS:  CONSTITUTIONAL: No fever, chills  NECK: No pain or stiffness  RESPIRATORY: No cough, SOB  CARDIOVASCULAR: No chest pain, palpitations  GASTROINTESTINAL: No abdominal pain  GENITOURINARY: No dysuria  NEUROLOGICAL: No HA  MUSCULOSKELETAL: +ve LE swelling b/l       T(C): 36.8 (08-09-23 @ 05:10), Max: 37.1 (08-08-23 @ 15:14)  HR: 72 (08-09-23 @ 05:10) (70 - 72)  BP: 156/67 (08-09-23 @ 05:10) (132/61 - 156/67)  RR: 16 (08-09-23 @ 05:10) (16 - 20)  SpO2: 95% (08-09-23 @ 08:54) (91% - 95%)  Wt(kg): --Vital Signs Last 24 Hrs  T(C): 36.8 (09 Aug 2023 05:10), Max: 37.1 (08 Aug 2023 15:14)  T(F): 98.2 (09 Aug 2023 05:10), Max: 98.8 (08 Aug 2023 15:14)  HR: 72 (09 Aug 2023 05:10) (70 - 72)  BP: 156/67 (09 Aug 2023 05:10) (132/61 - 156/67)  BP(mean): --  RR: 16 (09 Aug 2023 05:10) (16 - 20)  SpO2: 95% (09 Aug 2023 08:54) (91% - 95%)    Parameters below as of 09 Aug 2023 08:54  Patient On (Oxygen Delivery Method): nasal cannula  O2 Flow (L/min): 2    MEDICATIONS  (STANDING):  furosemide   Injectable 40 milliGRAM(s) IV Push two times a day  pantoprazole    Tablet 40 milliGRAM(s) Oral before breakfast  simvastatin 20 milliGRAM(s) Oral at bedtime  tamsulosin 0.4 milliGRAM(s) Oral at bedtime    MEDICATIONS  (PRN):  acetaminophen     Tablet .. 650 milliGRAM(s) Oral every 6 hours PRN Temp greater or equal to 38C (100.4F), Mild Pain (1 - 3)  melatonin 3 milliGRAM(s) Oral at bedtime PRN Insomnia      PHYSICAL EXAM:  GENERAL: NAD  EYES: clear conjunctiva  ENMT: Moist mucous membranes  NECK: Supple, No JVD  CHEST/LUNG: Clear to diminished bilaterally; No rales, rhonchi, wheezing, or rubs  HEART: S1, S2, Regular rate and rhythm  ABDOMEN: Soft, Nontender, Nondistended; Bowel sounds present  NEURO: Alert & Oriented X3  EXTREMITIES/SKIN: 2-3+b/l LE edema with chronic skin changes,  no calf tenderness    Consultant(s) Notes Reviewed:  [x ] YES  [ ] NO  Care Discussed with Consultants/Other Providers [ x] YES  [ ] NO    LABS:                        7.7    5.72  )-----------( 211      ( 09 Aug 2023 06:29 )             24.5     08-09    133<L>  |  100  |  30<H>  ----------------------------<  102<H>  4.5   |  22  |  1.28    Ca    8.9      09 Aug 2023 06:29  Phos  3.9     08-09  Mg     2.2     08-09    TPro  6.9  /  Alb  3.5  /  TBili  0.7  /  DBili  x   /  AST  22  /  ALT  23  /  AlkPhos  91  08-08    PT/INR - ( 09 Aug 2023 06:29 )   PT: 17.4 sec;   INR: 1.55 ratio      PTT - ( 09 Aug 2023 06:29 )  PTT:36.1 sec  CAPILLARY BLOOD GLUCOSE    Urinalysis Basic - ( 09 Aug 2023 06:29 )  Color: x / Appearance: x / SG: x / pH: x  Gluc: 102 mg/dL / Ketone: x  / Bili: x / Urobili: x   Blood: x / Protein: x / Nitrite: x   Leuk Esterase: x / RBC: x / WBC x   Sq Epi: x / Non Sq Epi: x / Bacteria: x    RADIOLOGY & ADDITIONAL TESTS:  < from: Xray Chest 1 View- PORTABLE-Urgent (08.08.23 @ 17:54) >    ACC: [id number] EXAM:  XR CHEST PORTABLE URGENT 1V   ORDERED BY: ASHLEY MELLO     PROCEDURE DATE:  08/08/2023          INTERPRETATION:  An AP portable chest x-ray was performed for chest pain.    There are no prior studies for comparison.    There is an enlarged heart with multilead pacemaker in situ and   associated with pulmonary venous congestion and bilateral pleural   effusions. There is probable passive atelectasis in both lower lobes.   There is no pneumothorax. There is no other hilaror mediastinal   abnormality. The bony thorax is notable for degenerative changes of the   spine and shoulders.    IMPRESSION:  1. Cardiomegaly with CHF and bilateral pleural effusions.  2. Multilead pacemaker in situ.  3. Probable passive atelectasis in both lower lobes.      < end of copied text >

## 2023-08-11 NOTE — CHART NOTE - NSCHARTNOTEFT_GEN_A_CORE
Prescription for Eliquis and Entresto was sent to the pharmacy, as per pharmacy Eliquis has $104 and Entresto has $121 copayment. Above discussed with pt's spouse and family is agreement with copay  D/C Lovenox and start Eliquis     Above d/w Dr. Acosta
Pt for discharge back home today, spoke to pt's spouse in details about medication changes and outpt follow up, spouse reported that they have an appointment with Cardiologist on Monday 8/14/23. All questions answered     D/C plan discussed with Dr. Acosta and Dr. Kaminski

## 2023-08-11 NOTE — PROGRESS NOTE ADULT - REASON FOR ADMISSION
Lower extremity swelling

## 2023-08-11 NOTE — PROGRESS NOTE ADULT - ASSESSMENT
89 years old admitted with CHF and consult was called for QUIANA and Hyponatremia  QUIANA Pre renal - cardiorenal, due to CHF , renal function improved with diuretics .  Hyponatremia due to CHF    Continue Diurteics IV, fluid restriction 1 liter per day  2 gm sodium diet  Follow Urine FeNA  Follow UA    Anemia, GI bleed,  work up as per PMD.  PAF - HR stable.    
89 y/o M with PMHx HTN, HLD, Afib, PPM-s/p replacement of RV fractured lead, BPH,Anemia CVA mild R sided res weakness, presents to the ED today with chief complaint of increasing exertional shortness of breath and lower extremity swelling,acute diastolic HF with acute hypoxic respiratory failure,,QUIANA,hyponatremia Fe def anemia and hyperkalemia.  1.Acute diastolic HF-IV lasix.Add Entresto 24/26.  2.QUIANA and hyponatremia-Renal f/u.  3.Afib and CVA-lovenox (resume coumadin if NOAC not covered).D/W wife low INR, change to NOAC if covered, D/W Dr. Fair outpatient Cardiologist.  4.Fe def anemia-IV irn. As per wife had CT  abd and pelvis at [facility] and plan for flex sig by his GI next week,  5.Echocardiogram.  6.HTN-hold procardia xl.  7.BPH-flomax.  8.PPI.    
89 y/o M with PMHx HTN, HLD, Afib, PPM-s/p replacement of RV fractured lead, BPH,Anemia CVA mild R sided res weakness, presents to the ED today with chief complaint of increasing exertional shortness of breath and lower extremity swelling,acute diastolic HF with acute hypoxic respiratory failure,,QUIANA,hyponatremia Fe def anemia and hyperkalemia.  1.Acute diastolic HF-Change to po  lasix,cont Entresto 24/26.  2.QUIANA and hyponatremia-Renal f/u noted.  3.Afib and CVA- NOAC instead of coumadin, D/W Dr. Fair outpatient Cardiologist.  4.Fe def anemia-IV irn. As per wife had CT  abd and pelvis at [facility] and plan for flex sig by his GI next week.  5.Echocardiogram as above, consider sleep study as outpatient-R/O MARGARITA..  6.HTN-Entresto.  7.BPH-flomax.  8.PPI.  9.D/C home f/u as outpatient with EP-, PCP ,GI and Cardiology-.    
89 y/o M with PMHx HTN, HLD, Afib, PPM-s/p replacement of RV fractured lead, BPH,Anemia CVA mild R sided res weakness, presents to the ED today with chief complaint of increasing exertional shortness of breath and lower extremity swelling,acute diastolic HF with acute hypoxic respiratory failure,,QUIANA,hyponatremia and hyperkalemia.  1.Acute diastolic HF-IV lasix.  2.QUIANA and hyponatremia-Renal eval.  3.Afib and CVA-lovenox , hold coumadin.  4.Anemia profile,  5.Echocardiogram.  6.HTN-hold procardia xl.  7.BPH-flomax.  8.PPI.  9.Check TSH.  10.Above plan d/w son at bedside.
89 years old admitted with CHF and consult was called for QUIANA and Hyponatremia  QUIANA Pre renal - cardiorenal, due to CHF , renal function improved with diuretics .  Hyponatremia due to CHF, resolved.    Continue Diurteics IV, fluid restriction 1 liter per day, placed on Entresto  Follow renal function after admission , discussed with the patient and his wife.  Follow renal US   2 gm sodium diet  Follow Urine FeNA, above 1%, good response to diuretics   Follow UA - with no sediment    Anemia, GI bleed,  work up as per PMD.  PAF - HR stable.    
89 y/o M from home with PMHx HTN, HLD, Afib, PPM, BPH presents to the ED with chief complaint of increasing exertional shortness of breath and lower extremity swelling. Admitted to medicine for new onset CHF, started on Lasix. Pt was also noted with QUIANA and followed by Nephro Dr. Kaminski.   Pt was also found to have anemia, no acute sign or symptoms of bleeding. Coumadin on hold and full dose Lovenox started. 
89 y/o M from home with PMHx HTN, HLD, Afib, PPM, BPH presents to the ED with chief complaint of increasing exertional shortness of breath and lower extremity swelling. Admitted to medicine for new onset CHF, started on Lasix. Pt was also noted with QUIANA and followed by Nephro Dr. Kaminski.

## 2023-08-11 NOTE — DISCHARGE NOTE NURSING/CASE MANAGEMENT/SOCIAL WORK - NSDCPEFALRISK_GEN_ALL_CORE
For information on Fall & Injury Prevention, visit: [email or web address] OR  [email or web address] OR  https://www.cdc.gov/steadi/patient.html

## 2023-08-11 NOTE — DISCHARGE NOTE NURSING/CASE MANAGEMENT/SOCIAL WORK - PATIENT PORTAL LINK FT
You can access the FollowMyHealth Patient Portal offered by [facility] by registering at the following website: [email or web address]. By joining [employer]’s FollowMyHealth portal, you will also be able to view your health information using other applications (apps) compatible with our system.

## 2023-08-11 NOTE — GOALS OF CARE CONVERSATION - ADVANCED CARE PLANNING - CONVERSATION DETAILS
Spoke to pt and pt's spouse Oralia Ulrich at bedside, updated on clinical course, imaging results, treatment plan/options and discharge plans. As per pt's spouse pt had a CVA few years ago that had left him with mild right sided weakness and some cognitive issues. Wife is the sole care provider for the pt , pt ambulates with cane at baseline and wife helps him with the ADLs.   Also spoke about cardiac and respiratory resuscitative measures including CPR, shock, vasopressors, invasive ventilatory support via intubation. All risks and benefits discussed. All questions answered.   Pt's spouse reported that pt has a HCP and living will where all his wishes are outlined  Pt is DNR/DNI  MOLST form reviewed in details and completed with the spouse

## 2023-08-12 ENCOUNTER — TRANSCRIPTION ENCOUNTER (OUTPATIENT)
Age: 88
End: 2023-08-12

## 2023-08-14 ENCOUNTER — TRANSCRIPTION ENCOUNTER (OUTPATIENT)
Age: 88
End: 2023-08-14

## 2023-08-17 ENCOUNTER — APPOINTMENT (OUTPATIENT)
Age: 88
End: 2023-08-17
Payer: MEDICARE

## 2023-08-17 VITALS
DIASTOLIC BLOOD PRESSURE: 64 MMHG | OXYGEN SATURATION: 97 % | SYSTOLIC BLOOD PRESSURE: 122 MMHG | HEART RATE: 75 BPM | RESPIRATION RATE: 16 BRPM | TEMPERATURE: 97.9 F

## 2023-08-17 DIAGNOSIS — D64.9 ANEMIA, UNSPECIFIED: ICD-10-CM

## 2023-08-17 DIAGNOSIS — I50.30 UNSPECIFIED DIASTOLIC (CONGESTIVE) HEART FAILURE: ICD-10-CM

## 2023-08-17 DIAGNOSIS — I48.91 UNSPECIFIED ATRIAL FIBRILLATION: ICD-10-CM

## 2023-08-17 DIAGNOSIS — Z86.79 PERSONAL HISTORY OF OTHER DISEASES OF THE CIRCULATORY SYSTEM: ICD-10-CM

## 2023-08-17 DIAGNOSIS — Z86.39 PERSONAL HISTORY OF OTHER ENDOCRINE, NUTRITIONAL AND METABOLIC DISEASE: ICD-10-CM

## 2023-08-17 DIAGNOSIS — Z87.891 PERSONAL HISTORY OF NICOTINE DEPENDENCE: ICD-10-CM

## 2023-08-17 DIAGNOSIS — Z86.73 PERSONAL HISTORY OF TRANSIENT ISCHEMIC ATTACK (TIA), AND CEREBRAL INFARCTION W/OUT RESIDUAL DEFICITS: ICD-10-CM

## 2023-08-17 PROBLEM — Z00.00 ENCOUNTER FOR PREVENTIVE HEALTH EXAMINATION: Status: ACTIVE | Noted: 2023-08-17

## 2023-08-17 PROCEDURE — 99348 HOME/RES VST EST LOW MDM 30: CPT

## 2023-08-17 RX ORDER — SACUBITRIL AND VALSARTAN 24; 26 MG/1; MG/1
24-26 TABLET, FILM COATED ORAL TWICE DAILY
Refills: 0 | Status: ACTIVE | COMMUNITY

## 2023-08-17 RX ORDER — OMEPRAZOLE 40 MG/1
40 CAPSULE, DELAYED RELEASE ORAL DAILY
Refills: 0 | Status: ACTIVE | COMMUNITY

## 2023-08-17 RX ORDER — APIXABAN 5 MG/1
5 TABLET, FILM COATED ORAL
Refills: 0 | Status: ACTIVE | COMMUNITY

## 2023-08-17 RX ORDER — TAMSULOSIN HYDROCHLORIDE 0.4 MG/1
0.4 CAPSULE ORAL AT BEDTIME
Refills: 0 | Status: ACTIVE | COMMUNITY

## 2023-08-17 RX ORDER — FUROSEMIDE 20 MG/1
20 TABLET ORAL DAILY
Qty: 30 | Refills: 0 | Status: ACTIVE | COMMUNITY

## 2023-08-17 RX ORDER — SIMVASTATIN 20 MG/1
20 TABLET, FILM COATED ORAL
Refills: 0 | Status: ACTIVE | COMMUNITY

## 2023-08-18 PROBLEM — Z86.73 HISTORY OF CEREBROVASCULAR ACCIDENT: Status: RESOLVED | Noted: 2023-08-17 | Resolved: 2023-08-18

## 2023-08-18 PROBLEM — Z86.79 HISTORY OF HYPERTENSION: Status: RESOLVED | Noted: 2023-08-17 | Resolved: 2023-08-18

## 2023-08-18 PROBLEM — Z86.39 HISTORY OF HYPERLIPIDEMIA: Status: RESOLVED | Noted: 2023-08-17 | Resolved: 2023-08-18

## 2023-08-18 NOTE — HISTORY OF PRESENT ILLNESS
[Post-hospitalization from ___ Hospital] : Post-hospitalization from [unfilled] Hospital [Admitted on: ___] : The patient was admitted on [unfilled] [Discharged on ___] : discharged on [unfilled] [Discharge Summary] : discharge summary [Pertinent Labs] : pertinent labs [Radiology Findings] : radiology findings [Discharge Med List] : discharge medication list [Med Reconciliation] : medication reconciliation has been completed [Patient Contacted By: ____] : and contacted by [unfilled] [FreeTextEntry3] : TCM [facility] Hospitalization Follow up: Heart Failure [FreeTextEntry2] : Mr. Ulrich is a 89 y/o man with HTN, HLD, AFIB, CVA 2022, PPM, Anemia and BPH [facility] exertional shortness of breath and lower extremity edema. CXR revealed cardiomegaly and bilateral pleural effusions. Admitted and treated for new onset heart failure. Labs showed that he was anemic, no source of low H/H. s/p Iron infusion. Coumadin d/c and started on Eliquis. Discharged home without home care services.   Please refer to patient's complete medical chart with documents for a full hospital course, for this is only a brief summary.  Mr. Ulrich seen in the home today. Spouse present during home visit. Observed patient walk down the stairs from the upper level of the home without any distress. Ambulates independently in the home and uses cane/walker out of the home. Currently, he reports that he is feels good. Breathing and edema has greatly improved. Sleeps with 1-2 pillows for comfort. Weight today: 171 lbs. His spouse monitors his I/Os daily. Medications reviewed with spouse and adherent with prescribed medications. Adhering with low sodium diet prepared by spouse daily. Denies chest pain, SOB at rest/exertion, orthopnea/PND, fever/chills, hematuria, melena and/or increased edema. Followed up with cardiologist Dr. Fair on 8/14/23, no changes in treatment plan and RTO in one month. He is scheduled to see PCP, Dr. Riggs on 8/21/23. Of note, spouse states that patient just completed capsule study and no signs of bleeding.

## 2023-08-18 NOTE — PHYSICAL EXAM
[No Acute Distress] : no acute distress [Well-Appearing] : well-appearing [Normal Voice/Communication] : normal voice/communication [Normal Sclera/Conjunctiva] : normal sclera/conjunctiva [No JVD] : no jugular venous distention [Supple] : supple [No Respiratory Distress] : no respiratory distress  [Clear to Auscultation] : lungs were clear to auscultation bilaterally [No Accessory Muscle Use] : no accessory muscle use [Normal Rate] : normal rate  [Pedal Pulses Present] : the pedal pulses are present [No Edema] : there was no peripheral edema [No Extremity Clubbing/Cyanosis] : no extremity clubbing/cyanosis [Soft] : abdomen soft [Non Tender] : non-tender [Non-distended] : non-distended [Normal Bowel Sounds] : normal bowel sounds [No Joint Swelling] : no joint swelling [No Rash] : no rash [Normal Affect] : the affect was normal [Alert and Oriented x3] : oriented to person, place, and time [Normal Insight/Judgement] : insight and judgment were intact

## 2023-08-18 NOTE — ASSESSMENT
[FreeTextEntry1] : Mr. Ulrich is a pleasant 89 y/o man with Pmhx of HTN, HLD, AFIB, CVA 2022, PPM, Anemia and BPH with recent hospitalization for heart failure. Currently, euvolemic on examination.     # HFpEF LVEF 58% Continue sacubitril-valsartan and furosemide as directed HTN and afib optimization of medical management. Daily weights and record low sodium diet F/u w/ cardiology Monitor anemia.  # Atrial fibrillation Rate controlled. Continue anticoagulation. No changes in management.  # Anemia, iron deficiency Continue iron supplementation/iron-rich diet. Asymptomatic

## 2023-08-18 NOTE — COUNSELING
[Fall prevention counseling provided] : Fall prevention counseling provided [Adequate lighting] : Adequate lighting [No throw rugs] : No throw rugs [Use proper foot wear] : Use proper foot wear [Use recommended devices] : Use recommended devices [FreeTextEntry1] : cane/walker

## 2023-08-18 NOTE — HEALTH RISK ASSESSMENT
[No] : No [No falls in past year] : Patient reported no falls in the past year [0] : 2) Feeling down, depressed, or hopeless: Not at all (0) [PHQ-2 Negative - No further assessment needed] : PHQ-2 Negative - No further assessment needed [Low Salt Diet] : low salt [General Adherence] : general adherence [With Significant Other] : lives with significant other [] :  [Feels Safe at Home] : Feels safe at home [With Patient/Caregiver] : , with patient/caregiver [Former] : Former [JNP1Sihyo] : 0 [Change in mental status noted] : No change in mental status noted [Reports changes in hearing] : Reports no changes in hearing [Reports changes in vision] : Reports no changes in vision [Reports changes in dental health] : Reports no changes in dental health [ColonoscopyComments] : capsule test  [AdvancecareDate] : 08/23 [FreeTextEntry4] : Patient/spouse verbalization of allowing natural death and no aggressive efforts to sustain life. Spouse reports she has paperwork but unable to provide during visit.  [de-identified] : smoked 5 packs a day. quit mid 1970s.

## 2023-08-18 NOTE — PLAN
[FreeTextEntry1] : -f/u with PCP on monday for blood work  -Patient/family was informed about NP's role/ [facility] program and overview of transitional care reviewed with patient. Patient/family educated on topics of importance such as compliance with all provider visits, prescribed medication regimen, and low salt / heart healthy diet. Patient/Family encouraged calling NP with any issues, concerns or questions, also educated to notify NP if experiencing CP, SOB , cough, increased mucus/phlegm production, abdominal discomfort/swelling, difficulty sleeping or lying flat, fever, chills, fatigue, weight gain of 2-3lbs in 24 hours or 5lbs in one week, dizziness, lightheadedness, n/v/d/c, swelling to extremities and/or any c/o or concerns. Reassurance provided.

## 2023-08-24 ENCOUNTER — TRANSCRIPTION ENCOUNTER (OUTPATIENT)
Age: 88
End: 2023-08-24

## 2023-09-05 ENCOUNTER — TRANSCRIPTION ENCOUNTER (OUTPATIENT)
Age: 88
End: 2023-09-05

## 2025-03-04 ENCOUNTER — EMERGENCY (EMERGENCY)
Facility: HOSPITAL | Age: 89
LOS: 1 days | Discharge: HOME | End: 2025-03-04
Attending: STUDENT IN AN ORGANIZED HEALTH CARE EDUCATION/TRAINING PROGRAM
Payer: MEDICARE

## 2025-03-04 VITALS
SYSTOLIC BLOOD PRESSURE: 112 MMHG | DIASTOLIC BLOOD PRESSURE: 52 MMHG | HEART RATE: 71 BPM | RESPIRATION RATE: 19 BRPM | OXYGEN SATURATION: 94 %

## 2025-03-04 VITALS
TEMPERATURE: 98 F | WEIGHT: 190.04 LBS | DIASTOLIC BLOOD PRESSURE: 73 MMHG | HEART RATE: 70 BPM | OXYGEN SATURATION: 93 % | SYSTOLIC BLOOD PRESSURE: 168 MMHG | RESPIRATION RATE: 18 BRPM | HEIGHT: 70 IN

## 2025-03-04 DIAGNOSIS — Z98.49 CATARACT EXTRACTION STATUS, UNSPECIFIED EYE: Chronic | ICD-10-CM

## 2025-03-04 DIAGNOSIS — Z95.0 PRESENCE OF CARDIAC PACEMAKER: Chronic | ICD-10-CM

## 2025-03-04 LAB
ANION GAP SERPL CALC-SCNC: 5 MMOL/L — SIGNIFICANT CHANGE UP (ref 5–17)
APTT BLD: 35.3 SEC — SIGNIFICANT CHANGE UP (ref 24.5–35.6)
BASOPHILS # BLD AUTO: 0.06 K/UL — SIGNIFICANT CHANGE UP (ref 0–0.2)
BASOPHILS NFR BLD AUTO: 1 % — SIGNIFICANT CHANGE UP (ref 0–2)
BUN SERPL-MCNC: 32 MG/DL — HIGH (ref 7–18)
CALCIUM SERPL-MCNC: 8.6 MG/DL — SIGNIFICANT CHANGE UP (ref 8.4–10.5)
CHLORIDE SERPL-SCNC: 107 MMOL/L — SIGNIFICANT CHANGE UP (ref 96–108)
CO2 SERPL-SCNC: 24 MMOL/L — SIGNIFICANT CHANGE UP (ref 22–31)
CREAT SERPL-MCNC: 1.31 MG/DL — HIGH (ref 0.5–1.3)
EGFR: 51 ML/MIN/1.73M2 — LOW
EOSINOPHIL # BLD AUTO: 0.09 K/UL — SIGNIFICANT CHANGE UP (ref 0–0.5)
EOSINOPHIL NFR BLD AUTO: 1.5 % — SIGNIFICANT CHANGE UP (ref 0–6)
GLUCOSE SERPL-MCNC: 134 MG/DL — HIGH (ref 70–99)
HCT VFR BLD CALC: 33.5 % — LOW (ref 39–50)
HGB BLD-MCNC: 10.7 G/DL — LOW (ref 13–17)
IMM GRANULOCYTES NFR BLD AUTO: 0.3 % — SIGNIFICANT CHANGE UP (ref 0–0.9)
INR BLD: 1.49 RATIO — HIGH (ref 0.85–1.16)
LYMPHOCYTES # BLD AUTO: 1.12 K/UL — SIGNIFICANT CHANGE UP (ref 1–3.3)
LYMPHOCYTES # BLD AUTO: 18.2 % — SIGNIFICANT CHANGE UP (ref 13–44)
MCHC RBC-ENTMCNC: 25 PG — LOW (ref 27–34)
MCHC RBC-ENTMCNC: 31.9 G/DL — LOW (ref 32–36)
MCV RBC AUTO: 78.3 FL — LOW (ref 80–100)
MONOCYTES # BLD AUTO: 0.42 K/UL — SIGNIFICANT CHANGE UP (ref 0–0.9)
MONOCYTES NFR BLD AUTO: 6.8 % — SIGNIFICANT CHANGE UP (ref 2–14)
NEUTROPHILS # BLD AUTO: 4.46 K/UL — SIGNIFICANT CHANGE UP (ref 1.8–7.4)
NEUTROPHILS NFR BLD AUTO: 72.2 % — SIGNIFICANT CHANGE UP (ref 43–77)
NRBC BLD AUTO-RTO: 0 /100 WBCS — SIGNIFICANT CHANGE UP (ref 0–0)
PLATELET # BLD AUTO: 209 K/UL — SIGNIFICANT CHANGE UP (ref 150–400)
POTASSIUM SERPL-MCNC: 4.5 MMOL/L — SIGNIFICANT CHANGE UP (ref 3.5–5.3)
POTASSIUM SERPL-SCNC: 4.5 MMOL/L — SIGNIFICANT CHANGE UP (ref 3.5–5.3)
PROTHROM AB SERPL-ACNC: 17.4 SEC — HIGH (ref 9.9–13.4)
RBC # BLD: 4.28 M/UL — SIGNIFICANT CHANGE UP (ref 4.2–5.8)
RBC # FLD: 15.9 % — HIGH (ref 10.3–14.5)
SODIUM SERPL-SCNC: 136 MMOL/L — SIGNIFICANT CHANGE UP (ref 135–145)
WBC # BLD: 6.17 K/UL — SIGNIFICANT CHANGE UP (ref 3.8–10.5)
WBC # FLD AUTO: 6.17 K/UL — SIGNIFICANT CHANGE UP (ref 3.8–10.5)

## 2025-03-04 PROCEDURE — 99283 EMERGENCY DEPT VISIT LOW MDM: CPT | Mod: 25

## 2025-03-04 PROCEDURE — 85730 THROMBOPLASTIN TIME PARTIAL: CPT

## 2025-03-04 PROCEDURE — 99284 EMERGENCY DEPT VISIT MOD MDM: CPT | Mod: 25

## 2025-03-04 PROCEDURE — 80048 BASIC METABOLIC PNL TOTAL CA: CPT

## 2025-03-04 PROCEDURE — 85610 PROTHROMBIN TIME: CPT

## 2025-03-04 PROCEDURE — 30901 CONTROL OF NOSEBLEED: CPT | Mod: RT

## 2025-03-04 PROCEDURE — 36415 COLL VENOUS BLD VENIPUNCTURE: CPT

## 2025-03-04 PROCEDURE — 85025 COMPLETE CBC W/AUTO DIFF WBC: CPT

## 2025-03-04 PROCEDURE — 30903 CONTROL OF NOSEBLEED: CPT | Mod: RT

## 2025-03-04 RX ORDER — LIDOCAINE HCL/EPINEPHRINE/PF 1 %-1:200K
10 AMPUL (ML) INJECTION ONCE
Refills: 0 | Status: COMPLETED | OUTPATIENT
Start: 2025-03-04 | End: 2025-03-04

## 2025-03-04 RX ADMIN — Medication 2 SPRAY(S): at 22:37

## 2025-03-04 RX ADMIN — Medication 10 MILLILITER(S): at 23:00

## 2025-03-04 NOTE — ED ADULT NURSE NOTE - OBJECTIVE STATEMENT
Patient presents to ED c/o nose bleed from right nostril x1 hr. Pt denies any chest pain, sob, dizziness or nausea. Oxugen saturation remains >93% on room air. Pt reports hx of Eliquis.

## 2025-03-04 NOTE — ED PROVIDER NOTE - PHYSICAL EXAMINATION
CONSTITUTIONAL: non-toxic, well appearing  SKIN: no rash, no petechiae.  EYES: pink conjunctiva, anicteric  ENT: tongue and uvular midline, no exudates, moist mucous membranes. Right nare oozing dark blood, dried blood visualized in posterior oropharynx.   NECK: Supple; no meningismus, no JVD  CARD: RRR, no murmurs, equal radial pulses bilaterally 2+  RESP: CTAB, no respiratory distress  ABD: Soft, non-tender, non-distended, no peritoneal signs  EXT: Normal ROM x4. No edema.  NEURO: Alert, oriented. Neuro exam nonfocal  PSYCH: Cooperative, appropriate.

## 2025-03-04 NOTE — ED ADULT NURSE NOTE - NSFALLUNIVINTERV_ED_ALL_ED
Bed/Stretcher in lowest position, wheels locked, appropriate side rails in place/Call bell, personal items and telephone in reach/Instruct patient to call for assistance before getting out of bed/chair/stretcher/Non-slip footwear applied when patient is off stretcher/[place] to call system/Physically safe environment - no spills, clutter or unnecessary equipment/Purposeful proactive rounding/Room/bathroom lighting operational, light cord in reach

## 2025-03-04 NOTE — ED PROVIDER NOTE - CLINICAL SUMMARY MEDICAL DECISION MAKING FREE TEXT BOX
Jim: 89-year-old male with past medical history hypertension, hyperlipidemia, atrial fibrillation on Eliquis status post permanent pacemaker, BPH presents with right-sided nosebleed today.  Patient with wife, states patient started bleeding from right nare about 5 hours ago, states bleeding stopped with direct pressure.  Wife states bleeding started again approximate 1 hour ago with clots.  Denies any fevers, chest pain, shortness of breath, dizziness, lightheadedness, bloody stools, black tarry stools, or rash.  Denies any head injury or trauma.  Wife states patient with history of nosebleeds in the past, states he required cauterization approximately 1 year ago.  Physical exam per above. Patient with oozing dark blood from right nare, attempted to stop bleeding with direct pressure and Afrin, patient with persistent oozing dark red blood.  Rhino Rocket soaked with lidocaine/epi inserted with hemostasis.  No signs/symptoms of anemia, patient hemodynamically stable in no acute distress.  Will obtain labs rule out anemia rule out thrombocytopenia, observe with disposition pending workup.

## 2025-03-04 NOTE — ED PROVIDER NOTE - OBJECTIVE STATEMENT
89-year-old male with past medical history hypertension, hyperlipidemia, atrial fibrillation on Eliquis status post permanent pacemaker, BPH presents with right-sided nosebleed today.  Patient with wife, states patient started bleeding from right nare about 5 hours ago, states bleeding stopped with direct pressure.  Wife states bleeding started again approximate 1 hour ago with clots.  Denies any fevers, chest pain, shortness of breath, dizziness, lightheadedness, bloody stools, black tarry stools, or rash.  Denies any head injury or trauma.  Wife states patient with history of nosebleeds in the past, states he required cauterization approximately 1 year ago.  Denies any additional complaints.

## 2025-03-04 NOTE — ED PROVIDER NOTE - PROGRESS NOTE DETAILS
Patient observed in the ED until 2 AM no further episode of epistaxis.  Patient has own ENT with home he will follow-up in the next 2 days.  Return precautions given

## 2025-03-04 NOTE — ED PROVIDER NOTE - NSFOLLOWUPINSTRUCTIONS_ED_ALL_ED_FT
Nosebleed, Adult  A nosebleed is when blood comes out of the nose. Nosebleeds are common. Usually, they are not a sign of a serious condition.    Nosebleeds can happen if a blood vessel in your nose starts to bleed or if the lining of your nose (mucous membrane) cracks. They are commonly caused by:  Allergies.  Colds.  Picking your nose.  Blowing your nose too hard.  An injury from sticking an object into your nose or getting hit in the nose.  Dry or cold air.  Less common causes of nosebleeds include:  Toxic fumes.  Something abnormal in the nose or in the air-filled spaces in the bones of the face (sinuses).  Growths in the nose, such as polyps.  Blood thinners or conditions that cause blood to clot slowly.  Certain illnesses or procedures that irritate or dry out the nasal passages.  Follow these instructions at home:  When you have a nosebleed:    The correct and incorrect way to hold your head and fingers for first aid during a nosebleed.  Sit down and tilt your head slightly forward.  Use a clean towel or tissue to pinch your nostrils under the bony part of your nose. After 5 minutes, let go of your nose and see if the bleeding starts again. Do not release pressure before that time. If there is still bleeding, repeat the pinching and holding for 5 minutes or until the bleeding stops.  Do not place tissues or gauze in the nose to stop the bleeding.  Avoid lying down and avoid tilting your head backward. That may cause blood to collect in the throat and cause gagging or coughing.  Use a nasal spray decongestant to help with a nosebleed as told by your health care provider.  After a nosebleed:    Avoid blowing your nose or sniffing for a number of hours.  Avoid straining, lifting, or bending at the waist for several days. You may go back to other normal activities as you are able.  If you are taking aspirin or blood thinners and you have nosebleeds, talk to your health care provider. These medicines make bleeding more likely.  Ask your health care provider if you should stop taking the medicines or if you should adjust the dose.  Do not stop taking medicines that your health care provider has recommended unless he or she tells you to stop taking them.  If your nosebleed was caused by dry mucous membranes, use over-the-counter saline nasal spray or gel and a humidifier as told by your health care provider. This will keep the mucous membranes moist and allow them to heal. If you need to use a nasal spray or gel:  Choose one that is water-soluble.  Use only as much as you need and use it only as often as needed.  Do not lie down right after you use it.  If you get nosebleeds often, talk with your health care provider about medical treatments. Options may include:  Nasal cautery. This treatment stops and prevents nosebleeds by using a chemical swab or electrical device to lightly burn tiny blood vessels inside the nose.  Nasal packing. A gauze or other material is placed in the nose to keep constant pressure on the bleeding area.  Contact a health care provider if:  You have a fever.  You get nosebleeds often or more often than usual.  You bruise very easily.  You have a nose bleed from having something stuck in your nose.  You have bleeding in your mouth.  You vomit or cough up brown material.  You have a nosebleed after you start a new medicine.  Get help right away if:  You have a nosebleed after a fall or a head injury.  Your nosebleed does not go away after 20 minutes.  You feel dizzy or weak.  You have unusual bleeding from other parts of your body.  You have unusual bruising on other parts of your body.  You get sweaty.  You vomit blood.  Summary  A nosebleed is when blood comes out of the nose. Common causes include allergies, an injury to the nose, or cold or dry air.  Initial treatment includes applying pressure for 5 minutes.  Moisturizing the nose with saline nasal spray or gel after a nosebleed may help prevent future bleeding.  Get help right away if your nosebleed does not go away after 20 minutes.  This information is not intended to replace advice given to you by your health care provider. Make sure you discuss any questions you have with your health care provider.    Document Revised: 12/27/2022 Document Reviewed: 12/27/2022

## 2025-03-04 NOTE — ED PROVIDER NOTE - PATIENT PORTAL LINK FT
You can access the FollowMyHealth Patient Portal offered by [facility] by registering at the following website: [email or web address]. By joining [employer]’s FollowMyHealth portal, you will also be able to view your health information using other applications (apps) compatible with our system.

## 2025-03-05 NOTE — ED PROCEDURE NOTE - NS_EDPROVIDERDISPOUSERTYPE_ED_A_ED
Care Due:                  Date            Visit Type   Department     Provider  --------------------------------------------------------------------------------                                ESTABLISHED                              PATIENT -    [facility]  Last Visit: 12-      [facility]           David Bose  Next Visit: None Scheduled  None         None Found                                                            Last  Test          Frequency    Reason                     Performed    Due Date  --------------------------------------------------------------------------------    CBC.........  12 months..  nabumetone, valACYclovir.  03- 03-    Cr..........  12 months..  nabumetone, valACYclovir.  04- 04-    [facility] Embedded Care Due Messages. Reference number: [id number].   3/15/2024 12:20:47 PM CDT  
Please see the attached refill request.  
Attending Attestation (For Attendings USE Only)...

## 2025-03-11 ENCOUNTER — NON-APPOINTMENT (OUTPATIENT)
Age: 89
End: 2025-03-11

## 2025-03-18 ENCOUNTER — APPOINTMENT (OUTPATIENT)
Dept: OTOLARYNGOLOGY | Facility: CLINIC | Age: 89
End: 2025-03-18
Payer: MEDICARE

## 2025-03-18 VITALS — HEART RATE: 70 BPM | SYSTOLIC BLOOD PRESSURE: 119 MMHG | OXYGEN SATURATION: 97 % | DIASTOLIC BLOOD PRESSURE: 69 MMHG

## 2025-03-18 DIAGNOSIS — R04.0 EPISTAXIS: ICD-10-CM

## 2025-03-18 PROCEDURE — 99204 OFFICE O/P NEW MOD 45 MIN: CPT | Mod: 25

## 2025-03-18 PROCEDURE — 31238 NSL/SINS NDSC SRG NSL HEMRRG: CPT | Mod: RT
